# Patient Record
Sex: MALE | Race: WHITE | NOT HISPANIC OR LATINO | Employment: OTHER | URBAN - METROPOLITAN AREA
[De-identification: names, ages, dates, MRNs, and addresses within clinical notes are randomized per-mention and may not be internally consistent; named-entity substitution may affect disease eponyms.]

---

## 2017-04-25 ENCOUNTER — HISTORICAL (OUTPATIENT)
Dept: LAB | Facility: HOSPITAL | Age: 62
End: 2017-04-25

## 2017-12-13 LAB — CRC RECOMMENDATION EXT: NORMAL

## 2023-06-20 DIAGNOSIS — I10 HYPERTENSION, UNSPECIFIED TYPE: ICD-10-CM

## 2023-06-20 DIAGNOSIS — F41.9 ANXIETY: Primary | ICD-10-CM

## 2023-06-20 RX ORDER — METOPROLOL SUCCINATE 50 MG/1
50 TABLET, EXTENDED RELEASE ORAL DAILY
Qty: 30 TABLET | Refills: 2 | Status: SHIPPED | OUTPATIENT
Start: 2023-06-20 | End: 2023-09-11 | Stop reason: SDUPTHER

## 2023-06-20 RX ORDER — ALPRAZOLAM 0.5 MG/1
0.5 TABLET ORAL 3 TIMES DAILY PRN
Qty: 90 TABLET | Refills: 2 | Status: SHIPPED | OUTPATIENT
Start: 2023-06-20 | End: 2023-09-21 | Stop reason: SDUPTHER

## 2023-06-20 NOTE — TELEPHONE ENCOUNTER
Pharmacy faxed refill request on Xanax and Metoprolol. Will send script to Dr. Amador for approval. Patient last seen by Dr. Amador 4/3/23.

## 2023-07-20 DIAGNOSIS — I10 HYPERTENSION, UNSPECIFIED TYPE: Primary | ICD-10-CM

## 2023-07-20 RX ORDER — VENLAFAXINE HYDROCHLORIDE 75 MG/1
CAPSULE, EXTENDED RELEASE ORAL 2 TIMES DAILY
COMMUNITY
Start: 2023-07-19 | End: 2023-09-21

## 2023-07-20 RX ORDER — IRBESARTAN 150 MG/1
150 TABLET ORAL
COMMUNITY
Start: 2023-07-19 | End: 2023-07-20 | Stop reason: SDUPTHER

## 2023-07-20 RX ORDER — ALBUTEROL SULFATE 90 UG/1
AEROSOL, METERED RESPIRATORY (INHALATION)
COMMUNITY
Start: 2023-06-02 | End: 2023-09-21 | Stop reason: SDUPTHER

## 2023-07-20 RX ORDER — NEOMYCIN SULFATE, POLYMYXIN B SULFATE, AND DEXAMETHASONE 3.5; 10000; 1 MG/G; [USP'U]/G; MG/G
OINTMENT OPHTHALMIC
COMMUNITY
Start: 2023-06-29

## 2023-07-20 RX ORDER — ATORVASTATIN CALCIUM 20 MG/1
20 TABLET, FILM COATED ORAL NIGHTLY
COMMUNITY
Start: 2023-04-26 | End: 2023-09-21 | Stop reason: SDUPTHER

## 2023-07-20 RX ORDER — MONTELUKAST SODIUM 10 MG/1
10 TABLET ORAL
COMMUNITY
Start: 2023-05-12 | End: 2023-09-21 | Stop reason: SDUPTHER

## 2023-07-20 RX ORDER — BUPRENORPHINE AND NALOXONE 2; .5 MG/1; MG/1
FILM, SOLUBLE BUCCAL; SUBLINGUAL DAILY
COMMUNITY
Start: 2023-07-19

## 2023-07-20 RX ORDER — IRBESARTAN 150 MG/1
150 TABLET ORAL DAILY
Qty: 30 TABLET | Refills: 1 | Status: SHIPPED | OUTPATIENT
Start: 2023-07-20 | End: 2023-09-21 | Stop reason: SDUPTHER

## 2023-07-20 RX ORDER — ESOMEPRAZOLE MAGNESIUM 40 MG/1
40 CAPSULE, DELAYED RELEASE ORAL 2 TIMES DAILY
COMMUNITY
Start: 2023-04-26 | End: 2024-01-31 | Stop reason: SDUPTHER

## 2023-09-11 DIAGNOSIS — I10 HYPERTENSION, UNSPECIFIED TYPE: ICD-10-CM

## 2023-09-11 RX ORDER — METOPROLOL SUCCINATE 50 MG/1
25 TABLET, EXTENDED RELEASE ORAL 2 TIMES DAILY
Qty: 30 TABLET | Refills: 2 | Status: SHIPPED | OUTPATIENT
Start: 2023-09-11 | End: 2023-09-21

## 2023-09-13 PROCEDURE — 85025 COMPLETE CBC W/AUTO DIFF WBC: CPT | Performed by: FAMILY MEDICINE

## 2023-09-13 PROCEDURE — 82306 VITAMIN D 25 HYDROXY: CPT | Performed by: FAMILY MEDICINE

## 2023-09-13 PROCEDURE — 82550 ASSAY OF CK (CPK): CPT | Performed by: FAMILY MEDICINE

## 2023-09-13 PROCEDURE — 83036 HEMOGLOBIN GLYCOSYLATED A1C: CPT | Performed by: FAMILY MEDICINE

## 2023-09-13 PROCEDURE — 84443 ASSAY THYROID STIM HORMONE: CPT | Performed by: FAMILY MEDICINE

## 2023-09-13 PROCEDURE — 80076 HEPATIC FUNCTION PANEL: CPT | Performed by: FAMILY MEDICINE

## 2023-09-13 PROCEDURE — 80048 BASIC METABOLIC PNL TOTAL CA: CPT | Performed by: FAMILY MEDICINE

## 2023-09-13 PROCEDURE — 84550 ASSAY OF BLOOD/URIC ACID: CPT | Performed by: FAMILY MEDICINE

## 2023-09-13 PROCEDURE — 80061 LIPID PANEL: CPT | Performed by: FAMILY MEDICINE

## 2023-09-13 PROCEDURE — 84153 ASSAY OF PSA TOTAL: CPT | Performed by: FAMILY MEDICINE

## 2023-09-18 PROBLEM — E78.5 HYPERLIPIDEMIA: Status: ACTIVE | Noted: 2023-09-18

## 2023-09-18 PROBLEM — J30.9 NASAL SINUS INFLAMMATION DUE TO ALLERGY: Status: ACTIVE | Noted: 2023-09-18

## 2023-09-18 PROBLEM — I10 ESSENTIAL HYPERTENSION, BENIGN: Status: ACTIVE | Noted: 2023-09-18

## 2023-09-18 PROBLEM — F41.1 GENERALIZED ANXIETY DISORDER: Status: ACTIVE | Noted: 2023-09-18

## 2023-09-18 PROBLEM — K21.9 GERD (GASTROESOPHAGEAL REFLUX DISEASE): Status: ACTIVE | Noted: 2023-09-18

## 2023-09-21 ENCOUNTER — OFFICE VISIT (OUTPATIENT)
Dept: FAMILY MEDICINE | Facility: CLINIC | Age: 68
End: 2023-09-21
Payer: MEDICARE

## 2023-09-21 VITALS
OXYGEN SATURATION: 96 % | HEIGHT: 64 IN | BODY MASS INDEX: 26.63 KG/M2 | SYSTOLIC BLOOD PRESSURE: 132 MMHG | TEMPERATURE: 97 F | RESPIRATION RATE: 20 BRPM | DIASTOLIC BLOOD PRESSURE: 82 MMHG | HEART RATE: 80 BPM | WEIGHT: 156 LBS

## 2023-09-21 DIAGNOSIS — Z88.9 HX OF SEASONAL ALLERGIES: ICD-10-CM

## 2023-09-21 DIAGNOSIS — I10 ESSENTIAL HYPERTENSION, BENIGN: ICD-10-CM

## 2023-09-21 DIAGNOSIS — F41.1 GENERALIZED ANXIETY DISORDER: Primary | ICD-10-CM

## 2023-09-21 DIAGNOSIS — Z79.899 ENCOUNTER FOR LONG-TERM (CURRENT) USE OF OTHER MEDICATIONS: ICD-10-CM

## 2023-09-21 DIAGNOSIS — E78.2 MIXED HYPERLIPIDEMIA: ICD-10-CM

## 2023-09-21 DIAGNOSIS — J44.9 CHRONIC OBSTRUCTIVE PULMONARY DISEASE, UNSPECIFIED COPD TYPE: ICD-10-CM

## 2023-09-21 DIAGNOSIS — F17.200 SMOKER: ICD-10-CM

## 2023-09-21 DIAGNOSIS — Z87.891 PERSONAL HISTORY OF TOBACCO USE, PRESENTING HAZARDS TO HEALTH: ICD-10-CM

## 2023-09-21 PROBLEM — E53.8 FOLATE DEFICIENCY: Status: ACTIVE | Noted: 2023-09-21

## 2023-09-21 PROBLEM — G89.29 CHRONIC BACK PAIN: Status: ACTIVE | Noted: 2023-09-21

## 2023-09-21 PROBLEM — M54.9 CHRONIC BACK PAIN: Status: ACTIVE | Noted: 2023-09-21

## 2023-09-21 PROBLEM — G47.00 INSOMNIA: Status: ACTIVE | Noted: 2023-09-21

## 2023-09-21 PROBLEM — K29.70 GASTRITIS: Status: ACTIVE | Noted: 2023-09-21

## 2023-09-21 PROBLEM — G56.00 CTS (CARPAL TUNNEL SYNDROME): Status: ACTIVE | Noted: 2023-09-21

## 2023-09-21 PROBLEM — D64.9 ANEMIA: Status: ACTIVE | Noted: 2023-09-21

## 2023-09-21 PROBLEM — R73.9 HYPERGLYCEMIA: Status: ACTIVE | Noted: 2023-09-21

## 2023-09-21 PROBLEM — F32.A DEPRESSION: Status: ACTIVE | Noted: 2023-09-21

## 2023-09-21 PROCEDURE — 99214 OFFICE O/P EST MOD 30 MIN: CPT | Mod: ,,, | Performed by: FAMILY MEDICINE

## 2023-09-21 PROCEDURE — 99214 PR OFFICE/OUTPT VISIT, EST, LEVL IV, 30-39 MIN: ICD-10-PCS | Mod: ,,, | Performed by: FAMILY MEDICINE

## 2023-09-21 RX ORDER — FLUTICASONE FUROATE, UMECLIDINIUM BROMIDE AND VILANTEROL TRIFENATATE 200; 62.5; 25 UG/1; UG/1; UG/1
1 POWDER RESPIRATORY (INHALATION) DAILY
COMMUNITY
End: 2023-09-21 | Stop reason: SDUPTHER

## 2023-09-21 RX ORDER — ALBUTEROL SULFATE 90 UG/1
1-2 AEROSOL, METERED RESPIRATORY (INHALATION)
Qty: 18 G | Refills: 5 | Status: SHIPPED | OUTPATIENT
Start: 2023-09-21

## 2023-09-21 RX ORDER — ATORVASTATIN CALCIUM 20 MG/1
20 TABLET, FILM COATED ORAL NIGHTLY
Qty: 90 TABLET | Refills: 3 | Status: SHIPPED | OUTPATIENT
Start: 2023-09-21 | End: 2024-01-31 | Stop reason: SDUPTHER

## 2023-09-21 RX ORDER — FLUTICASONE FUROATE, UMECLIDINIUM BROMIDE AND VILANTEROL TRIFENATATE 200; 62.5; 25 UG/1; UG/1; UG/1
1 POWDER RESPIRATORY (INHALATION) DAILY
Qty: 60 EACH | Refills: 11 | Status: SHIPPED | OUTPATIENT
Start: 2023-09-21 | End: 2024-01-31 | Stop reason: SDUPTHER

## 2023-09-21 RX ORDER — METOPROLOL SUCCINATE 50 MG/1
50 TABLET, EXTENDED RELEASE ORAL DAILY
Qty: 90 TABLET | Refills: 3 | Status: SHIPPED | OUTPATIENT
Start: 2023-09-21 | End: 2024-01-31 | Stop reason: SDUPTHER

## 2023-09-21 RX ORDER — METOPROLOL SUCCINATE 50 MG/1
50 TABLET, EXTENDED RELEASE ORAL DAILY
Start: 2023-09-21 | End: 2023-09-21 | Stop reason: SDUPTHER

## 2023-09-21 RX ORDER — LANOLIN ALCOHOL/MO/W.PET/CERES
1000 CREAM (GRAM) TOPICAL DAILY
COMMUNITY

## 2023-09-21 RX ORDER — ALPRAZOLAM 0.5 MG/1
0.5 TABLET ORAL 3 TIMES DAILY PRN
Qty: 90 TABLET | Refills: 2 | Status: SHIPPED | OUTPATIENT
Start: 2023-09-21 | End: 2023-12-22 | Stop reason: SDUPTHER

## 2023-09-21 RX ORDER — ASCORBIC ACID 500 MG
500 TABLET ORAL DAILY
COMMUNITY

## 2023-09-21 RX ORDER — QUETIAPINE FUMARATE 50 MG/1
50 TABLET, FILM COATED ORAL NIGHTLY
Qty: 30 TABLET | Refills: 11 | Status: SHIPPED | OUTPATIENT
Start: 2023-09-21 | End: 2024-01-31

## 2023-09-21 RX ORDER — MONTELUKAST SODIUM 10 MG/1
10 TABLET ORAL DAILY
Qty: 90 TABLET | Refills: 3 | Status: SHIPPED | OUTPATIENT
Start: 2023-09-21 | End: 2024-01-31

## 2023-09-21 RX ORDER — IRBESARTAN 150 MG/1
150 TABLET ORAL DAILY
Qty: 90 TABLET | Refills: 3 | Status: SHIPPED | OUTPATIENT
Start: 2023-09-21 | End: 2024-01-31 | Stop reason: SDUPTHER

## 2023-09-21 NOTE — ASSESSMENT & PLAN NOTE
Patient's cholesterol is well controlled.  His last LDL was 74 in November of 2022.  We will recheck his cholesterol in 2 months.

## 2023-09-21 NOTE — PROGRESS NOTES
Patient Name: Varinder Pantoja Jr.     Patient ID: [id number]     Chief Complaint: Follow-up (Here for medication refill Xanax .5 mg  TID, and lab results.)      HPI:     Varinder Pantoja Jr. is a 68 y.o. male here today for Anxiety follow up & insomnia.    Past Medical History:   Diagnosis Date    Anemia     Chronic back pain     COPD (chronic obstructive pulmonary disease)     CTS (carpal tunnel syndrome)     Depression     Essential hypertension, benign     Folate deficiency     Gastritis     Generalized anxiety disorder     GERD (gastroesophageal reflux disease)     Hyperglycemia     Hyperlipidemia     Insomnia     Nasal sinus inflammation due to allergy     Smoker         Past Surgical History:   Procedure Laterality Date    BACK SURGERY      BILATERAL INGUINAL HERNIA REPAIR      TONSILLECTOMY          Social History     Socioeconomic History    Marital status:     Number of children: 1   Tobacco Use    Smoking status: Every Day     Current packs/day: 1.00     Types: Cigarettes    Smokeless tobacco: Never   Substance and Sexual Activity    Alcohol use: Not Currently     Comment: Quit 10 years ago    Drug use: Never        Current Outpatient Medications   Medication Instructions    albuterol (PROVENTIL/VENTOLIN HFA) 90 mcg/actuation inhaler 1-2 puffs, Inhalation, Every 4-6 hours PRN    ALPRAZolam (XANAX) 0.5 mg, Oral, 3 times daily PRN    ascorbic acid (vitamin C) (VITAMIN C) 500 mg, Oral, Daily    atorvastatin (LIPITOR) 20 mg, Oral, Nightly    buprenorphine-naloxone 2-0.5 mg (SUBOXONE) 2-0.5 mg Film Sublingual, Daily    cyanocobalamin (VITAMIN B-12) 1,000 mcg, Oral, Daily    esomeprazole (NEXIUM) 40 mg, Oral, 2 times daily    fluticasone-umeclidin-vilanter (TRELEGY ELLIPTA) 200-62.5-25 mcg inhaler 1 puff, Inhalation, Daily    irbesartan (AVAPRO) 150 mg, Oral, Daily    metoprolol succinate (TOPROL-XL) 50 mg, Oral, Daily    montelukast (SINGULAIR) 10 mg, Oral, Daily    neomycin-polymyxin-dexamethasone (DEXACINE)  "3.5 mg/g-10,000 unit/g-0.1 % Oint No dose, route, or frequency recorded.    QUEtiapine (SEROQUEL) 50 mg, Oral, Nightly       Review of patient's allergies indicates:  No Known Allergies       There is no immunization history on file for this patient.    Patient Care Team:  Byron Amador Sr., MD as PCP - General (Family Medicine)     Subjective:     Review of Systems    10 point review of systems conducted, negative except as stated in the history of present illness. See HPI for details.    Objective:     Visit Vitals  /82 (BP Location: Left arm, Patient Position: Sitting, BP Method: Medium (Manual))   Pulse 80   Temp 97 °F (36.1 °C)   Resp 20   Ht 5' 3.5" (1.613 m)   Wt 70.8 kg (156 lb)   SpO2 96%   BMI 27.20 kg/m²       Physical Exam  Constitutional:       Appearance: Normal appearance.   HENT:      Head: Normocephalic and atraumatic.   Cardiovascular:      Rate and Rhythm: Normal rate and regular rhythm.   Pulmonary:      Effort: Pulmonary effort is normal.      Breath sounds: Normal breath sounds.   Abdominal:      Palpations: Abdomen is soft.      Tenderness: There is no abdominal tenderness.   Musculoskeletal:         General: No swelling or tenderness. Normal range of motion.      Cervical back: Normal range of motion and neck supple.      Right lower leg: No edema.      Left lower leg: No edema.   Lymphadenopathy:      Cervical: No cervical adenopathy.   Skin:     General: Skin is warm and dry.   Neurological:      General: No focal deficit present.      Mental Status: He is alert and oriented to person, place, and time.   Psychiatric:         Mood and Affect: Mood normal.           Assessment:       ICD-10-CM ICD-9-CM   1. Generalized anxiety disorder  F41.1 300.02   2. Essential hypertension, benign  I10 401.1   3. Mixed hyperlipidemia  E78.2 272.2   4. Chronic obstructive pulmonary disease, unspecified COPD type  J44.9 496   5. Hx of seasonal allergies  Z88.9 V15.09   6. Smoker  F17.200 " 305.1   7. Personal history of tobacco use, presenting hazards to health  Z87.891 V15.82   8. Encounter for long-term (current) use of other medications  Z79.899 V58.69        Plan:     1. Generalized anxiety disorder  Overview:  Continue with Xanax 0.5 mg TID PRN anxiety.  Patient is well controlled.  Practice deep breathing or abdominal breathing exercises when anxiety occurs.  Exercise daily. Get sunlight daily.  Avoid caffeine, alcohol and stimulants.  Practice positive phrases and repeat throughout the day, along with yoga and relaxation techniques.  Set healthy boundaries, avoid people and conversations that increase stress.  Educated patient on the risks of serotonin based medications such as serotonin modulators and SSRIs/SNRIs including common side effects of nausea, GI upset, headache dizziness as well as the rare risk for worsening symptoms of depression including development of suicidal thoughts or ideations, and serotonin syndrome.   Discussed benefits of medication not becoming noticeable until up to 6 weeks from start date.   Reports any symptoms of suicidal or homicidal ideations immediately, if clinic is closed go to nearest emergency room.  Discussed possibility of using benzodiazepines    Assessment & Plan:  Instructed patient to try and wean down the dosage of Xanax.  We are going to start the patient on low-dose Seroquel in an effort to help reduce his anxiety and insomnia and in turn help reduce his dependence on alprazolam.  Risks and benefits of Seroquel were discussed and patient agreed to a trial.    Orders:  -     QUEtiapine (SEROQUEL) 50 MG tablet; Take 1 tablet (50 mg total) by mouth every evening.  Dispense: 30 tablet; Refill: 11  -     ALPRAZolam (XANAX) 0.5 MG tablet; Take 1 tablet (0.5 mg total) by mouth 3 (three) times daily as needed for Anxiety.  Dispense: 90 tablet; Refill: 2    2. Essential hypertension, benign  Overview:  Continue with Irbesartan 150 mg daily + Toprol XL 50 mg  daily.  Patient is well controlled.  Low Sodium Diet (DASH Diet - Less than 2 grams of sodium per day).  Monitor blood pressure daily and log. Report consistent numbers greater than 140/90.  Maintain healthy weight with goal BMI <30. Exercise 30 minutes per day, 5 days per week.    Orders:  -     Discontinue: metoprolol succinate (TOPROL-XL) 50 MG 24 hr tablet; Take 1 tablet (50 mg total) by mouth once daily.  -     irbesartan (AVAPRO) 150 MG tablet; Take 1 tablet (150 mg total) by mouth once daily.  Dispense: 90 tablet; Refill: 3  -     metoprolol succinate (TOPROL-XL) 50 MG 24 hr tablet; Take 1 tablet (50 mg total) by mouth once daily.  Dispense: 90 tablet; Refill: 3    3. Mixed hyperlipidemia  Overview:  Continue with present medication:  Atorvastatin 20 mg daily    Stressed importance of dietary modifications. Follow a low cholesterol, low saturated fat diet with less that 200mg of cholesterol a day.  Avoid fried foods and high saturated fats (high saturated fats less than 7% of calories).  Add Flax Seed/Fish Oil supplements to diet. Increase dietary fiber.  Regular exercise can reduce LDL and raise HDL. Stressed importance of physical activity 5 times per week for 30 minutes per day.     Assessment & Plan:  Patient's cholesterol is well controlled.  His last LDL was 74 in November of 2022.  We will recheck his cholesterol in 2 months.        4. Chronic obstructive pulmonary disease, unspecified COPD type  Overview:  Continue with Ventolin HFA 90 mcg 1-2 puffs Q4-6 hours PRN SOB, cough, or wheezing.  He has not been using his Trelegy as directed.  Patient is not well controlled.  Avoid/ limit triggers such as pollen ,dust ,mold and animal dander.  Avoid smoke ,strong chemicals and strong cleaning products in addition to strong perfumes and colognes.  Use rescue inhaler or nebulizer when needed for wheezing or shortness of breath.  Report any respiratory tract infections and seek treatment quickly.  If on a  steroid maintenance inhaler this should be used every day.  If indicated weight loss is recommended.    Assessment & Plan:  Start taking Trelegy Ellipta 200-62.5-25 mcg 1 puff daily as prescribed, use every day.    Orders:  -     fluticasone-umeclidin-vilanter (TRELEGY ELLIPTA) 200-62.5-25 mcg inhaler; Inhale 1 puff into the lungs once daily.  Dispense: 60 each; Refill: 11  -     albuterol (PROVENTIL/VENTOLIN HFA) 90 mcg/actuation inhaler; Inhale 1-2 puffs into the lungs every 4 to 6 hours as needed for Wheezing.  Dispense: 18 g; Refill: 5    5. Hx of seasonal allergies  Comments:  Patient's sinus allergies well controlled with Singulair 10 mg daily.    6. Smoker  Overview:  Patient was offered smoking cessation techniques such as nicotine gum or patches.  They were also offered a more regimented smoking cessation program.  They were advised to decrease the number of cigarettes by 1 every few days until they completely stopped.  It was strongly recommended that they set a quit date and stick to it.  And finally, important health reasons to stop smoking were given to the patient. Approximately 5   minutes was spent discussing smoking cessation.    Orders:  -     CT Chest Lung Screening Low Dose; Future; Expected date: 09/21/2023    7. Personal history of tobacco use, presenting hazards to health  -     CT Chest Lung Screening Low Dose; Future; Expected date: 09/21/2023    8. Encounter for long-term (current) use of other medications  Overview:  Patient was instructed to continue with the prescribed medications as no adverse or cost issues were found.   Refills were given if needed.  Compliance issues were discussed as far as medications that patient is currently taking.  Discussed the possibility of getting off some medications that were not absolutely necessary.    Orders:  -     montelukast (SINGULAIR) 10 mg tablet; Take 1 tablet (10 mg total) by mouth once daily.  Dispense: 90 tablet; Refill: 3  -     atorvastatin  (LIPITOR) 20 MG tablet; Take 1 tablet (20 mg total) by mouth every evening.  Dispense: 90 tablet; Refill: 3        [] Discussed lab findings with the patient.  [] Discussed diet, exercise and if appropriate, weight loss.  [x] Instructions and information, including risks and benefits of prescribed medication(s) have been reviewed with the patient and patient verbalizes understanding. Questions have been answered to the patient's satisfaction.  [x] Appropriate counseling has been given regarding anxiety and depressive issues that were discussed today.  [] Any lab drawn today will be reviewed by physician at the time it is received and appropriate recommendations bill be made and discussed with patient.     Follow up in about 2 months (around 11/21/2023) for Follow Up.   In addition to their scheduled follow up, the patient has also been instructed to follow up on as needed basis.     Future Appointments   Date Time Provider Department Center   11/21/2023  2:00 PM Byron Amador Sr., MD [facility] Amador Sr, MD

## 2023-09-21 NOTE — ASSESSMENT & PLAN NOTE
Instructed patient to try and wean down the dosage of Xanax.  We are going to start the patient on low-dose Seroquel in an effort to help reduce his anxiety and insomnia and in turn help reduce his dependence on alprazolam.  Risks and benefits of Seroquel were discussed and patient agreed to a trial.

## 2023-11-06 ENCOUNTER — TELEPHONE (OUTPATIENT)
Dept: FAMILY MEDICINE | Facility: CLINIC | Age: 68
End: 2023-11-06
Payer: MEDICARE

## 2023-11-06 DIAGNOSIS — F17.200 SMOKER: Primary | ICD-10-CM

## 2023-11-06 DIAGNOSIS — Z87.891 PERSONAL HISTORY OF TOBACCO USE, PRESENTING HAZARDS TO HEALTH: ICD-10-CM

## 2023-11-06 NOTE — TELEPHONE ENCOUNTER
Spoke with patient in regards to CT of the chest that was supposed to be done. [facility] had the wrong number so a new order was sent and I spoke with patient to get correct phone number. Put the correct number on new order sent and told patient t be expecting a call.

## 2023-12-06 DIAGNOSIS — F17.210 NICOTINE DEPENDENCE, CIGARETTES, UNCOMPLICATED: ICD-10-CM

## 2023-12-06 DIAGNOSIS — F17.200 SMOKER: Primary | ICD-10-CM

## 2023-12-07 ENCOUNTER — TELEPHONE (OUTPATIENT)
Dept: FAMILY MEDICINE | Facility: CLINIC | Age: 68
End: 2023-12-07
Payer: MEDICARE

## 2023-12-07 NOTE — TELEPHONE ENCOUNTER
----- Message from Emma Victor sent at 12/6/2023  1:05 PM CST -----  Regarding: RETURNING CALL  HE LEFT A MESSAGE SAYING HE WAS RETURNING A CALL [phone]

## 2023-12-22 DIAGNOSIS — F41.1 GENERALIZED ANXIETY DISORDER: ICD-10-CM

## 2023-12-22 RX ORDER — ALPRAZOLAM 0.5 MG/1
0.5 TABLET ORAL 3 TIMES DAILY PRN
Qty: 90 TABLET | Refills: 0 | Status: SHIPPED | OUTPATIENT
Start: 2023-12-22 | End: 2024-01-31 | Stop reason: SDUPTHER

## 2023-12-22 NOTE — TELEPHONE ENCOUNTER
Last refill Alprazolam 0.5 mg   1 po TID prn   # 90 X 2  On 9/21/23        Last ov 9/21/23      Next appt 1/29/24  Lab    Next appt 1/31/24   results

## 2024-01-29 PROCEDURE — 80053 COMPREHEN METABOLIC PANEL: CPT | Performed by: FAMILY MEDICINE

## 2024-01-29 PROCEDURE — 82746 ASSAY OF FOLIC ACID SERUM: CPT | Performed by: FAMILY MEDICINE

## 2024-01-29 PROCEDURE — 82607 VITAMIN B-12: CPT | Performed by: FAMILY MEDICINE

## 2024-01-29 PROCEDURE — 85025 COMPLETE CBC W/AUTO DIFF WBC: CPT | Performed by: FAMILY MEDICINE

## 2024-01-29 PROCEDURE — 84443 ASSAY THYROID STIM HORMONE: CPT | Performed by: FAMILY MEDICINE

## 2024-01-29 PROCEDURE — 80061 LIPID PANEL: CPT | Performed by: FAMILY MEDICINE

## 2024-01-31 ENCOUNTER — OFFICE VISIT (OUTPATIENT)
Dept: FAMILY MEDICINE | Facility: CLINIC | Age: 69
End: 2024-01-31
Payer: MEDICARE

## 2024-01-31 VITALS
HEIGHT: 64 IN | RESPIRATION RATE: 20 BRPM | DIASTOLIC BLOOD PRESSURE: 72 MMHG | HEART RATE: 80 BPM | WEIGHT: 160.31 LBS | SYSTOLIC BLOOD PRESSURE: 132 MMHG | TEMPERATURE: 98 F | OXYGEN SATURATION: 95 % | BODY MASS INDEX: 27.37 KG/M2

## 2024-01-31 DIAGNOSIS — F17.200 SMOKER: ICD-10-CM

## 2024-01-31 DIAGNOSIS — J44.9 CHRONIC OBSTRUCTIVE PULMONARY DISEASE, UNSPECIFIED COPD TYPE: ICD-10-CM

## 2024-01-31 DIAGNOSIS — K21.9 GASTROESOPHAGEAL REFLUX DISEASE, UNSPECIFIED WHETHER ESOPHAGITIS PRESENT: ICD-10-CM

## 2024-01-31 DIAGNOSIS — F41.1 GENERALIZED ANXIETY DISORDER: ICD-10-CM

## 2024-01-31 DIAGNOSIS — D64.9 ANEMIA, UNSPECIFIED TYPE: ICD-10-CM

## 2024-01-31 DIAGNOSIS — Z13.6 ENCOUNTER FOR SCREENING FOR CARDIOVASCULAR DISORDERS: ICD-10-CM

## 2024-01-31 DIAGNOSIS — E78.2 MIXED HYPERLIPIDEMIA: ICD-10-CM

## 2024-01-31 DIAGNOSIS — F32.A DEPRESSION, UNSPECIFIED DEPRESSION TYPE: ICD-10-CM

## 2024-01-31 DIAGNOSIS — I10 ESSENTIAL HYPERTENSION, BENIGN: Primary | ICD-10-CM

## 2024-01-31 PROBLEM — E53.8 FOLATE DEFICIENCY: Status: RESOLVED | Noted: 2023-09-21 | Resolved: 2024-01-31

## 2024-01-31 PROBLEM — K29.70 GASTRITIS: Status: RESOLVED | Noted: 2023-09-21 | Resolved: 2024-01-31

## 2024-01-31 PROBLEM — J30.9 NASAL SINUS INFLAMMATION DUE TO ALLERGY: Status: RESOLVED | Noted: 2023-09-18 | Resolved: 2024-01-31

## 2024-01-31 PROBLEM — G56.00 CTS (CARPAL TUNNEL SYNDROME): Status: RESOLVED | Noted: 2023-09-21 | Resolved: 2024-01-31

## 2024-01-31 PROBLEM — Z88.9 HX OF SEASONAL ALLERGIES: Status: RESOLVED | Noted: 2023-09-21 | Resolved: 2024-01-31

## 2024-01-31 PROCEDURE — 99214 OFFICE O/P EST MOD 30 MIN: CPT | Mod: ,,, | Performed by: FAMILY MEDICINE

## 2024-01-31 RX ORDER — VENLAFAXINE HYDROCHLORIDE 75 MG/1
75 CAPSULE, EXTENDED RELEASE ORAL 2 TIMES DAILY
Qty: 180 CAPSULE | Refills: 3 | Status: SHIPPED | OUTPATIENT
Start: 2024-01-31 | End: 2024-05-16

## 2024-01-31 RX ORDER — AMOXICILLIN 500 MG/1
500 TABLET, FILM COATED ORAL 4 TIMES DAILY
COMMUNITY
End: 2024-05-16 | Stop reason: ALTCHOICE

## 2024-01-31 RX ORDER — IRBESARTAN 150 MG/1
150 TABLET ORAL DAILY
Qty: 90 TABLET | Refills: 3 | Status: SHIPPED | OUTPATIENT
Start: 2024-01-31

## 2024-01-31 RX ORDER — METOPROLOL SUCCINATE 50 MG/1
50 TABLET, EXTENDED RELEASE ORAL DAILY
Qty: 90 TABLET | Refills: 3 | Status: SHIPPED | OUTPATIENT
Start: 2024-01-31

## 2024-01-31 RX ORDER — ALPRAZOLAM 0.5 MG/1
0.5 TABLET ORAL 3 TIMES DAILY PRN
Qty: 90 TABLET | Refills: 2 | Status: SHIPPED | OUTPATIENT
Start: 2024-01-31 | End: 2024-05-09 | Stop reason: SDUPTHER

## 2024-01-31 RX ORDER — ATORVASTATIN CALCIUM 20 MG/1
20 TABLET, FILM COATED ORAL NIGHTLY
Qty: 90 TABLET | Refills: 3 | Status: SHIPPED | OUTPATIENT
Start: 2024-01-31

## 2024-01-31 RX ORDER — ESOMEPRAZOLE MAGNESIUM 40 MG/1
40 CAPSULE, DELAYED RELEASE ORAL 2 TIMES DAILY
Qty: 90 CAPSULE | Refills: 3 | Status: SHIPPED | OUTPATIENT
Start: 2024-01-31

## 2024-01-31 RX ORDER — VENLAFAXINE HYDROCHLORIDE 75 MG/1
75 CAPSULE, EXTENDED RELEASE ORAL 2 TIMES DAILY
COMMUNITY
Start: 2024-01-08 | End: 2024-01-31 | Stop reason: SDUPTHER

## 2024-01-31 RX ORDER — FLUTICASONE FUROATE, UMECLIDINIUM BROMIDE AND VILANTEROL TRIFENATATE 200; 62.5; 25 UG/1; UG/1; UG/1
1 POWDER RESPIRATORY (INHALATION) DAILY
Qty: 60 EACH | Refills: 11 | Status: SHIPPED | OUTPATIENT
Start: 2024-01-31 | End: 2024-04-29 | Stop reason: SDUPTHER

## 2024-01-31 NOTE — ASSESSMENT & PLAN NOTE
Lab Results   Component Value Date    LDL 85.00 01/29/2024    TRIG 67 01/29/2024    HDL 44 01/29/2024    TOTALCHOLEST 3 01/29/2024   Patient is at goal.

## 2024-01-31 NOTE — PROGRESS NOTES
Patient Name: Varinder Pantoja Jr.     Patient ID: [id number]     Chief Complaint: Results (Here for lab results.) and Medication Refill (Refill on Xanax.)      HPI:     Varinder Pantoja Jr. is a 68 y.o. male here today for follow up for Hypertension, Hyperlipidemia, Anemia, COPD, Depression, Anxiety, GERD, and lab work results. Reviewed and discussed lab work results.    Past Medical History:   Diagnosis Date    Anemia     Chronic back pain     COPD (chronic obstructive pulmonary disease)     CTS (carpal tunnel syndrome)     Depression     Essential hypertension, benign     Folate deficiency     Gastritis     Generalized anxiety disorder     GERD (gastroesophageal reflux disease)     Hyperglycemia     Hyperlipidemia     Insomnia     Nasal sinus inflammation due to allergy     Smoker     Vitamin D deficiency         Past Surgical History:   Procedure Laterality Date    BACK SURGERY      BILATERAL INGUINAL HERNIA REPAIR      SURGICAL REMOVAL OF NODULE OF VOCAL CORD      TONSILLECTOMY          Social History     Socioeconomic History    Marital status:     Number of children: 1   Tobacco Use    Smoking status: Every Day     Current packs/day: 0.50     Types: Cigarettes    Smokeless tobacco: Never   Substance and Sexual Activity    Alcohol use: Not Currently     Comment: Quit 10 years ago    Drug use: Never        Current Outpatient Medications   Medication Instructions    albuterol (PROVENTIL/VENTOLIN HFA) 90 mcg/actuation inhaler 1-2 puffs, Inhalation, Every 4-6 hours PRN    ALPRAZolam (XANAX) 0.5 mg, Oral, 3 times daily PRN    amoxicillin (AMOXIL) 500 mg, Oral, 4 times daily, For tooth  Rx on 1/26/24  Dr Vadim Bonilla    ascorbic acid (vitamin C) (VITAMIN C) 500 mg, Oral, Daily    atorvastatin (LIPITOR) 20 mg, Oral, Nightly    buprenorphine-naloxone 2-0.5 mg (SUBOXONE) 2-0.5 mg Film Sublingual, Daily    cyanocobalamin (VITAMIN B-12) 1,000 mcg, Oral, Daily    esomeprazole (NEXIUM) 40 mg, Oral, 2 times daily     "fluticasone-umeclidin-vilanter (TRELEGY ELLIPTA) 200-62.5-25 mcg inhaler 1 puff, Inhalation, Daily    irbesartan (AVAPRO) 150 mg, Oral, Daily    metoprolol succinate (TOPROL-XL) 50 mg, Oral, Daily    neomycin-polymyxin-dexamethasone (DEXACINE) 3.5 mg/g-10,000 unit/g-0.1 % Oint No dose, route, or frequency recorded.    venlafaxine (EFFEXOR-XR) 75 mg, Oral, 2 times daily       Review of patient's allergies indicates:  No Known Allergies       There is no immunization history on file for this patient.    Patient Care Team:  Byron Amador Sr., MD as PCP - General (Family Medicine)  Aayush Al MD (Ophthalmology)     Subjective:     Review of Systems    10 point review of systems conducted, negative except as stated in the history of present illness. See HPI for details.    Objective:     Visit Vitals  /72 (BP Location: Left arm, Patient Position: Sitting, BP Method: Medium (Manual))   Pulse 80   Temp 98.3 °F (36.8 °C)   Resp 20   Ht 5' 3.5" (1.613 m)   Wt 72.7 kg (160 lb 4.8 oz)   SpO2 95%   BMI 27.95 kg/m²       Physical Exam  Constitutional:       Appearance: Normal appearance.   HENT:      Head: Normocephalic and atraumatic.   Cardiovascular:      Rate and Rhythm: Normal rate and regular rhythm.   Pulmonary:      Effort: Pulmonary effort is normal.      Breath sounds: Normal breath sounds.   Abdominal:      Palpations: Abdomen is soft.      Tenderness: There is no abdominal tenderness.   Musculoskeletal:         General: No swelling or tenderness. Normal range of motion.      Cervical back: Normal range of motion and neck supple.      Right lower leg: No edema.      Left lower leg: No edema.   Lymphadenopathy:      Cervical: No cervical adenopathy.   Skin:     General: Skin is warm and dry.   Neurological:      General: No focal deficit present.      Mental Status: He is alert and oriented to person, place, and time.   Psychiatric:         Mood and Affect: Mood normal.         Assessment:       " ICD-10-CM ICD-9-CM   1. Essential hypertension, benign  I10 401.1   2. Mixed hyperlipidemia  E78.2 272.2   3. Chronic obstructive pulmonary disease, unspecified COPD type  J44.9 496   4. Gastroesophageal reflux disease, unspecified whether esophagitis present  K21.9 530.81   5. Anemia, unspecified type  D64.9 285.9   6. Generalized anxiety disorder  F41.1 300.02   7. Depression, unspecified depression type  F32.A 311   8. Smoker  F17.200 305.1   9. Encounter for screening for cardiovascular disorders  Z13.6 V81.2       Plan:   1. Essential hypertension, benign  Overview:  Hypertension Medications               irbesartan (AVAPRO) 150 MG tablet Take 1 tablet (150 mg total) by mouth once daily.    metoprolol succinate (TOPROL-XL) 50 MG 24 hr tablet Take 1 tablet (50 mg total) by mouth once daily.        Low Sodium Diet (DASH Diet - Less than 2 grams of sodium per day).  Monitor blood pressure daily and log. Report consistent numbers greater than 140/90.  Maintain healthy weight with goal BMI <30. Exercise 30 minutes per day, 5 days per week.    Assessment & Plan:  BP Readings from Last 1 Encounters:   01/31/24 132/72   Patient is at goal.     Orders:  -     irbesartan (AVAPRO) 150 MG tablet; Take 1 tablet (150 mg total) by mouth once daily.  Dispense: 90 tablet; Refill: 3  -     metoprolol succinate (TOPROL-XL) 50 MG 24 hr tablet; Take 1 tablet (50 mg total) by mouth once daily.  Dispense: 90 tablet; Refill: 3    2. Mixed hyperlipidemia  Overview:  Hyperlipidemia Medications               atorvastatin (LIPITOR) 20 MG tablet Take 1 tablet (20 mg total) by mouth every evening.        Stressed importance of dietary modifications. Follow a low cholesterol, low saturated fat diet with less that 200mg of cholesterol a day.  Avoid fried foods and high saturated fats (high saturated fats less than 7% of calories).  Add Flax Seed/Fish Oil supplements to diet. Increase dietary fiber.  Regular exercise can reduce LDL and raise  HDL. Stressed importance of physical activity 5 times per week for 30 minutes per day.     Assessment & Plan:  Lab Results   Component Value Date    LDL 85.00 01/29/2024    TRIG 67 01/29/2024    HDL 44 01/29/2024    TOTALCHOLEST 3 01/29/2024   Patient is at goal.     Orders:  -     atorvastatin (LIPITOR) 20 MG tablet; Take 1 tablet (20 mg total) by mouth every evening.  Dispense: 90 tablet; Refill: 3    3. Chronic obstructive pulmonary disease, unspecified COPD type  Overview:  Continue with Ventolin HFA 90 mcg 1-2 puffs Q4-6 hours PRN SOB, cough, or wheezing + Trelegy Ellipta 200-62.5-25 mcg 1 puff daily.  Avoid/ limit triggers such as pollen ,dust ,mold and animal dander.  Avoid smoke ,strong chemicals and strong cleaning products in addition to strong perfumes and colognes.  Use rescue inhaler or nebulizer when needed for wheezing or shortness of breath.  Report any respiratory tract infections and seek treatment quickly.  If on a steroid maintenance inhaler this should be used every day.  If indicated weight loss is recommended.    Assessment & Plan:  Patient is well controlled.    Orders:  -     fluticasone-umeclidin-vilanter (TRELEGY ELLIPTA) 200-62.5-25 mcg inhaler; Inhale 1 puff into the lungs once daily.  Dispense: 60 each; Refill: 11    4. Gastroesophageal reflux disease, unspecified whether esophagitis present  Overview:  Continue with Nexium 40 mg daily.  Avoid spicy, acidic, fried foods and alcohol.  Eat 2-3 hours before going to bed.  Avoid tight clothing, chew food thoroughly.  Reduce caffeine intake, avoid soda.    Assessment & Plan:  Patient is well controlled.    Orders:  -     esomeprazole (NEXIUM) 40 MG capsule; Take 1 capsule (40 mg total) by mouth 2 (two) times daily.  Dispense: 90 capsule; Refill: 3    5. Anemia, unspecified type  Overview:  H/H stable at this time.   Will continue to monitor.    Assessment & Plan:  Most recent Hgb 13 g/dL and Hct 41 % on 01/29/2024.      6. Generalized  anxiety disorder  Overview:  Continue with Xanax 0.5 mg TID PRN anxiety.  Practice deep breathing or abdominal breathing exercises when anxiety occurs.  Exercise daily. Get sunlight daily.  Avoid caffeine, alcohol and stimulants.  Practice positive phrases and repeat throughout the day, along with yoga and relaxation techniques.  Set healthy boundaries, avoid people and conversations that increase stress.  Discussed benefits of medication not becoming noticeable until up to 6 weeks from start date.   Reports any symptoms of suicidal or homicidal ideations immediately, if clinic is closed go to nearest emergency room.    Assessment & Plan:  Patient is well controlled on the above regimen.      7. Depression, unspecified depression type  Overview:  Continue with Effexor XR 75 mg BID.  Educated patient on the risks of serotonin based medications such as serotonin modulators and SSRIs/SNRIs including common side effects of nausea, GI upset, headache dizziness as well as the rare risk for worsening symptoms of depression including development of suicidal thoughts or ideations, and serotonin syndrome.   Discussed benefits of medication not becoming noticeable until up to 6 weeks from start date.   Exercise daily. Get sunlight daily.  Practice positive phrases and repeat throughout the day, along with yoga and relaxation techniques.  Establish good social support, make changes to reduce stress.  Reports any symptoms of suicidal/homicidal ideations or self harm immediately, if clinic is closed go to nearest emergency room.    Assessment & Plan:  Patient is well controlled.    Orders:  -     venlafaxine (EFFEXOR-XR) 75 MG 24 hr capsule; Take 1 capsule (75 mg total) by mouth 2 (two) times daily.  Dispense: 180 capsule; Refill: 3    8. Smoker  Overview:  Patient was offered smoking cessation techniques such as nicotine gum or patches.  They were also offered a more regimented smoking cessation program.  They were advised to  decrease the number of cigarettes by 1 every few days until they completely stopped.  It was strongly recommended that they set a quit date and stick to it.  And finally, important health reasons to stop smoking were given to the patient. Approximately 5 minutes was spent discussing smoking cessation.      9. Encounter for screening for cardiovascular disorders  -     CT Calcium Scoring Cardiac; Future; Expected date: 01/31/2024        [x] Discussed lab findings with the patient.  [x] Discussed diet, exercise and if appropriate, weight loss.  [x] Instructions and information, including risks and benefits of prescribed medication(s) have been reviewed with the patient and patient verbalizes understanding. Questions have been answered to the patient's satisfaction.  [x] Appropriate counseling has been given regarding anxiety and depressive issues that were discussed today.  [] Any lab drawn today will be reviewed by physician at the time it is received and appropriate recommendations bill be made and discussed with patient.     Follow up in about 3 months (around 4/30/2024) for Follow Up.   In addition to their scheduled follow up, the patient has also been instructed to follow up on as needed basis.     Future Appointments   Date Time Provider Department Center   5/8/2024  8:00 AM Byron Amador Sr., MD [facility] Jb Bourgeois Sr, MD

## 2024-03-22 ENCOUNTER — DOCUMENTATION ONLY (OUTPATIENT)
Dept: FAMILY MEDICINE | Facility: CLINIC | Age: 69
End: 2024-03-22
Payer: MEDICARE

## 2024-04-12 ENCOUNTER — TELEPHONE (OUTPATIENT)
Dept: FAMILY MEDICINE | Facility: CLINIC | Age: 69
End: 2024-04-12
Payer: MEDICARE

## 2024-04-12 NOTE — TELEPHONE ENCOUNTER
----- Message from Byron Amador Sr., MD sent at 4/5/2024 10:07 AM CDT -----  Tell patient has a low score of less than 10 indicating a low likelihood of any significant blockage.

## 2024-04-29 DIAGNOSIS — J44.9 CHRONIC OBSTRUCTIVE PULMONARY DISEASE, UNSPECIFIED COPD TYPE: ICD-10-CM

## 2024-04-29 RX ORDER — FLUTICASONE FUROATE, UMECLIDINIUM BROMIDE AND VILANTEROL TRIFENATATE 200; 62.5; 25 UG/1; UG/1; UG/1
1 POWDER RESPIRATORY (INHALATION) DAILY
Qty: 60 EACH | Refills: 11 | Status: SHIPPED | OUTPATIENT
Start: 2024-04-29

## 2024-04-30 DIAGNOSIS — F41.1 GENERALIZED ANXIETY DISORDER: ICD-10-CM

## 2024-04-30 NOTE — TELEPHONE ENCOUNTER
I cannot e-RX due to patient not having physical address on file. Please update this and let me know. I can fill

## 2024-05-07 RX ORDER — ALPRAZOLAM 0.5 MG/1
0.5 TABLET ORAL 3 TIMES DAILY PRN
Qty: 90 TABLET | Refills: 2 | OUTPATIENT
Start: 2024-05-07

## 2024-05-08 DIAGNOSIS — F41.1 GENERALIZED ANXIETY DISORDER: ICD-10-CM

## 2024-05-08 NOTE — TELEPHONE ENCOUNTER
"Dr Mohr  Mr Reeder  is requesting a refill on his Xanax 0.5 mg 1 po TID prn anxiety, he was last seen 1/31/2024 .Last lab was 01/2024, He was scheduled for today for his refill , but the appointment was cancelled due to no provider on campus,and is not rescheduled as of today. He is "not sleeping and all keyed up"states patient . Sulema Monsalve NP denied his Rx yesterday due to he needs a visit. If we get a cancellation next week Emma said she will put him in that time slot. Please advise  "

## 2024-05-09 RX ORDER — ALPRAZOLAM 0.5 MG/1
0.5 TABLET ORAL 3 TIMES DAILY PRN
Qty: 90 TABLET | Refills: 0 | Status: SHIPPED | OUTPATIENT
Start: 2024-05-09 | End: 2024-06-14 | Stop reason: SDUPTHER

## 2024-05-16 ENCOUNTER — OFFICE VISIT (OUTPATIENT)
Dept: FAMILY MEDICINE | Facility: CLINIC | Age: 69
End: 2024-05-16
Payer: MEDICARE

## 2024-05-16 VITALS
SYSTOLIC BLOOD PRESSURE: 138 MMHG | WEIGHT: 171 LBS | DIASTOLIC BLOOD PRESSURE: 74 MMHG | TEMPERATURE: 97 F | BODY MASS INDEX: 29.19 KG/M2 | HEART RATE: 80 BPM | HEIGHT: 64 IN | OXYGEN SATURATION: 97 % | RESPIRATION RATE: 18 BRPM

## 2024-05-16 DIAGNOSIS — F32.A DEPRESSION, UNSPECIFIED DEPRESSION TYPE: ICD-10-CM

## 2024-05-16 DIAGNOSIS — F41.1 GENERALIZED ANXIETY DISORDER: Primary | ICD-10-CM

## 2024-05-16 PROCEDURE — 99213 OFFICE O/P EST LOW 20 MIN: CPT | Mod: ,,, | Performed by: FAMILY MEDICINE

## 2024-05-16 RX ORDER — MONTELUKAST SODIUM 10 MG/1
10 TABLET ORAL NIGHTLY
COMMUNITY

## 2024-05-16 RX ORDER — CETIRIZINE HYDROCHLORIDE 10 MG/1
10 TABLET ORAL DAILY
COMMUNITY

## 2024-05-16 RX ORDER — SERTRALINE HYDROCHLORIDE 50 MG/1
50 TABLET, FILM COATED ORAL DAILY
Qty: 30 TABLET | Refills: 11 | Status: SHIPPED | OUTPATIENT
Start: 2024-05-16 | End: 2025-05-16

## 2024-05-16 RX ORDER — QUETIAPINE FUMARATE 50 MG/1
50 TABLET, FILM COATED ORAL NIGHTLY
COMMUNITY

## 2024-05-16 NOTE — PROGRESS NOTES
Family Medicine    Patient ID: [id number]   274}  Chief Complaint: Medication Refill and Depression         HPI:     Varinder Pantoja Jr. is a 69 y.o. male here today for medication management and refills. No other complaints today.     He is here for his refill of xanax. He called in earlier this week. He takes 0.5mg TID and has for anxiety.   He also has an rx for effexor. His mood is not controlled. He is not taking the effexor as it was not helping. He had no side effects on this medication. He is requesting to try zoloft. He has not tried this in the past. He reports no SI/HI but his mood is depressed, He has anxiety, trouble sleeping, melancholy, decreased interest in activities.  He takes Suboxone as well for  from Dr. Natalee Tran in [place]. 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 He is working to wean off of the suboxone.    reviewed.     274}  Past Medical History:   Diagnosis Date    Anemia     Chronic back pain     COPD (chronic obstructive pulmonary disease)     CTS (carpal tunnel syndrome)     Depression     Essential hypertension, benign     Folate deficiency     Gastritis     Generalized anxiety disorder     GERD (gastroesophageal reflux disease)     Hyperglycemia     Hyperlipidemia     Insomnia     Nasal sinus inflammation due to allergy     Smoker     Vitamin D deficiency          Past Surgical History:   Procedure Laterality Date    BACK SURGERY      BILATERAL INGUINAL HERNIA REPAIR      SURGICAL REMOVAL OF NODULE OF VOCAL CORD      TONSILLECTOMY          Social History     Tobacco Use    Smoking status: Every Day     Current packs/day: 0.50     Types: Cigarettes    Smokeless tobacco: Never   Substance and Sexual Activity    Alcohol use: Not Currently     Comment: Quit 10 years ago    Drug use: Never    Sexual activity: Not on file        Current Outpatient Medications   Medication Instructions    albuterol (PROVENTIL/VENTOLIN HFA) 90 mcg/actuation inhaler 1-2 puffs, Inhalation,  "Every 4-6 hours PRN    ALPRAZolam (XANAX) 0.5 mg, Oral, 3 times daily PRN    amoxicillin (AMOXIL) 500 mg, Oral, 4 times daily, For tooth  Rx on 1/26/24  Dr Vadim Bonilla    ascorbic acid (vitamin C) (VITAMIN C) 500 mg, Oral, Daily    atorvastatin (LIPITOR) 20 mg, Oral, Nightly    buprenorphine-naloxone 2-0.5 mg (SUBOXONE) 2-0.5 mg Film Sublingual, Daily    cetirizine (ZYRTEC) 10 mg, Oral, Daily    cyanocobalamin (VITAMIN B-12) 1,000 mcg, Oral, Daily    esomeprazole (NEXIUM) 40 mg, Oral, 2 times daily    fluticasone-umeclidin-vilanter (TRELEGY ELLIPTA) 200-62.5-25 mcg inhaler 1 puff, Inhalation, Daily    irbesartan (AVAPRO) 150 mg, Oral, Daily    metoprolol succinate (TOPROL-XL) 50 mg, Oral, Daily    montelukast (SINGULAIR) 10 mg, Oral, Nightly    neomycin-polymyxin-dexamethasone (DEXACINE) 3.5 mg/g-10,000 unit/g-0.1 % Oint No dose, route, or frequency recorded.    QUEtiapine (SEROQUEL) 50 mg, Oral, Nightly    sertraline (ZOLOFT) 50 mg, Oral, Daily     274}  Review of patient's allergies indicates:  No Known Allergies    Patient Care Team:  Byron Amador Sr., MD as PCP - General (Family Medicine)  Aayush Al MD (Ophthalmology)  Lennox Cabello MD as Consulting Physician (Gastroenterology)     Subjective:     Review of Systems    12 point review of systems conducted, negative except as stated in the history of present illness. See HPI for details.    Objective:   274}  Visit Vitals  /74   Pulse 80   Temp 97.4 °F (36.3 °C)   Resp 18   Ht 5' 3.5" (1.613 m)   Wt 77.6 kg (171 lb)   SpO2 97%   BMI 29.82 kg/m²         Physical Exam  Vitals reviewed.   Constitutional:       General: He is not in acute distress.     Appearance: Normal appearance.   Eyes:      Extraocular Movements: Extraocular movements intact.      Conjunctiva/sclera: Conjunctivae normal.   Cardiovascular:      Rate and Rhythm: Normal rate and regular rhythm.      Pulses: Normal pulses.      Heart sounds: Normal heart sounds. No murmur " heard.     No friction rub. No gallop.   Pulmonary:      Effort: Pulmonary effort is normal.      Breath sounds: Normal breath sounds. No wheezing, rhonchi or rales.   Skin:     General: Skin is warm and dry.      Findings: No lesion or rash.   Neurological:      General: No focal deficit present.      Mental Status: He is alert and oriented to person, place, and time.   Psychiatric:         Mood and Affect: Mood is anxious.         Behavior: Behavior normal.         Thought Content: Thought content normal.         Judgment: Judgment normal.         Labs Reviewed:    274}  Chemistry:  Lab Results   Component Value Date     01/29/2024    K 3.8 01/29/2024    CHLORIDE 107 01/29/2024    BUN 12.5 01/29/2024    CREATININE 0.82 01/29/2024    EGFRNORACEVR >60 01/29/2024    GLUCOSE 101 01/29/2024    CALCIUM 9.6 01/29/2024    ALKPHOS 115 01/29/2024    LABPROT 7.8 (H) 01/29/2024    ALBUMIN 4.1 01/29/2024    BILIDIR 0.2 09/13/2023    IBILI 0.30 09/13/2023    AST 19 01/29/2024    ALT 11 01/29/2024    GDJVHQKT15BG 39.1 09/13/2023    TSH 1.249 01/29/2024    PSA 0.34 09/13/2023        Lab Results   Component Value Date    HGBA1C 5.0 09/13/2023        Hematology:  Lab Results   Component Value Date    WBC 11.30 01/29/2024    HGB 13.0 (L) 01/29/2024    HCT 41.0 (L) 01/29/2024     01/29/2024       Lipid Panel:  Lab Results   Component Value Date    CHOL 142 01/29/2024    HDL 44 01/29/2024    LDL 85.00 01/29/2024    TRIG 67 01/29/2024    TOTALCHOLEST 3 01/29/2024          Assessment:    274}    ICD-10-CM ICD-9-CM   1. Generalized anxiety disorder  F41.1 300.02   2. Depression, unspecified depression type  F32.A 311        Plan:     1. Generalized anxiety disorder  Overview:  Continue with Xanax 0.5 mg TID PRN anxiety.  Practice deep breathing or abdominal breathing exercises when anxiety occurs.  Exercise daily. Get sunlight daily.  Avoid caffeine, alcohol and stimulants.  Practice positive phrases and repeat throughout  the day, along with yoga and relaxation techniques.  Set healthy boundaries, avoid people and conversations that increase stress.  Discussed benefits of medication not becoming noticeable until up to 6 weeks from start date.   Reports any symptoms of suicidal or homicidal ideations immediately, if clinic is closed go to nearest emergency room.    Assessment & Plan:  Not controlled.   Try zoloft 50mg.   Start with 25mg x 1 week then up to 50mg.   Rtc in 4 weeks or sooner if problems arise.     Red flags and indication for immediate medical attention discussed with the patient who is receptive, expressed understanding and is agreeable to plan. All questions answered.    Continue care with suboxone clinic.   Discussed need to wean off xanax in future. Pt is aware of need.   Cont med at current dose for now with plans to wean in future.     Orders:  -     sertraline (ZOLOFT) 50 MG tablet; Take 1 tablet (50 mg total) by mouth once daily.  Dispense: 30 tablet; Refill: 11    2. Depression, unspecified depression type  Overview:  Continue with Effexor XR 75 mg BID.  Educated patient on the risks of serotonin based medications such as serotonin modulators and SSRIs/SNRIs including common side effects of nausea, GI upset, headache dizziness as well as the rare risk for worsening symptoms of depression including development of suicidal thoughts or ideations, and serotonin syndrome.   Discussed benefits of medication not becoming noticeable until up to 6 weeks from start date.   Exercise daily. Get sunlight daily.  Practice positive phrases and repeat throughout the day, along with yoga and relaxation techniques.  Establish good social support, make changes to reduce stress.  Reports any symptoms of suicidal/homicidal ideations or self harm immediately, if clinic is closed go to nearest emergency room.    Assessment & Plan:  Controlled on current medication.   Continue treatment.     Orders:  -     sertraline (ZOLOFT) 50 MG  tablet; Take 1 tablet (50 mg total) by mouth once daily.  Dispense: 30 tablet; Refill: 11         Follow up in about 4 weeks (around 6/13/2024) for medication management and refills. . In addition to their scheduled follow up, the patient has also been instructed to follow up on as needed basis.     No future appointments.           Shireen Mohr MD

## 2024-06-12 NOTE — PROGRESS NOTES
Family Medicine    Patient ID: [id number]     Chief Complaint: Medication Refill (Medication refill and complaints of sinus cogestion.)    HPI:     Varinder Panotja Jr. is a 69 y.o. male here today for a follow up for anxiety and depression.  Started on Zoloft last month but pharmacy records show no refills yet.  He also needs to be weaned off of the Xanax    Past Medical History:   Diagnosis Date    Anemia     Chronic back pain     COPD (chronic obstructive pulmonary disease)     CTS (carpal tunnel syndrome)     Depression     Essential hypertension, benign     Folate deficiency     Gastritis     Generalized anxiety disorder     GERD (gastroesophageal reflux disease)     Hyperglycemia     Hyperlipidemia     Insomnia     Nasal sinus inflammation due to allergy     Smoker     Vitamin D deficiency         Past Surgical History:   Procedure Laterality Date    BACK SURGERY      BILATERAL INGUINAL HERNIA REPAIR      SURGICAL REMOVAL OF NODULE OF VOCAL CORD      TONSILLECTOMY          Social History     Tobacco Use    Smoking status: Every Day     Current packs/day: 0.50     Types: Cigarettes    Smokeless tobacco: Never   Substance and Sexual Activity    Alcohol use: Not Currently     Comment: Quit 10 years ago    Drug use: Never    Sexual activity: Not on file        Current Outpatient Medications   Medication Instructions    albuterol (PROVENTIL/VENTOLIN HFA) 90 mcg/actuation inhaler 1-2 puffs, Inhalation, Every 4-6 hours PRN    ALPRAZolam (XANAX) 0.5 mg, Oral, 3 times daily PRN    ascorbic acid (vitamin C) (VITAMIN C) 500 mg, Oral, Daily    atorvastatin (LIPITOR) 20 mg, Oral, Nightly    buprenorphine-naloxone 2-0.5 mg (SUBOXONE) 2-0.5 mg Film Sublingual, Daily    cetirizine (ZYRTEC) 10 mg, Oral, Daily    cyanocobalamin (VITAMIN B-12) 1,000 mcg, Oral, Daily    doxycycline (VIBRA-TABS) 100 mg, Oral, 2 times daily    esomeprazole (NEXIUM) 40 mg, Oral, 2 times daily    fluticasone-umeclidin-vilanter (TRELEGY ELLIPTA)  200-62.5-25 mcg inhaler 1 puff, Inhalation, Daily    ipratropium (ATROVENT) 21 mcg (0.03 %) nasal spray 2 sprays, Each Nostril, 3 times daily    irbesartan (AVAPRO) 150 mg, Oral, Daily    metoprolol succinate (TOPROL-XL) 50 mg, Oral, Daily    montelukast (SINGULAIR) 10 mg, Oral, Nightly    predniSONE (DELTASONE) 40 mg, Oral, Daily    QUEtiapine (SEROQUEL) 50 mg, Oral, Nightly    sertraline (ZOLOFT) 50 mg, Oral, Daily       Review of patient's allergies indicates:  No Known Allergies     Patient Care Team:  Byron Amador Sr., MD as PCP - General (Family Medicine)  Aayush Al MD (Ophthalmology)  Lennox Cabello MD as Consulting Physician (Gastroenterology)     Subjective:     Review of Systems   Constitutional:  Negative for activity change, appetite change, fever and unexpected weight change.   HENT:  Positive for congestion. Negative for dental problem, rhinorrhea and trouble swallowing.    Eyes:  Negative for visual disturbance.   Respiratory:  Positive for cough and wheezing. Negative for chest tightness and shortness of breath.    Cardiovascular:  Negative for chest pain, palpitations and leg swelling.   Gastrointestinal:  Negative for abdominal pain, blood in stool, constipation, diarrhea, nausea and vomiting.   Genitourinary:  Negative for difficulty urinating.   Musculoskeletal:  Negative for gait problem.   Skin:  Negative for rash and wound.   Neurological:  Negative for dizziness, syncope, speech difficulty, weakness and light-headedness.   Psychiatric/Behavioral:  Negative for confusion and suicidal ideas.        12 point review of systems conducted, negative except as stated in the history of present illness. See HPI for details.    Objective:     Visit Vitals  /84 (BP Location: Left arm, Patient Position: Sitting, BP Method: Medium (Manual))   Pulse 64   Temp 97.2 °F (36.2 °C) (Temporal)   Resp 18   Wt 72.7 kg (160 lb 3.2 oz)   SpO2 97%   BMI 27.93 kg/m²       Physical Exam  Vitals  and nursing note reviewed.   Constitutional:       General: He is not in acute distress.     Appearance: Normal appearance. He is not ill-appearing, toxic-appearing or diaphoretic.   HENT:      Head: Normocephalic and atraumatic.      Right Ear: Tympanic membrane, ear canal and external ear normal. There is no impacted cerumen.      Left Ear: Tympanic membrane, ear canal and external ear normal. There is no impacted cerumen.      Nose: No congestion or rhinorrhea.      Mouth/Throat:      Pharynx: Oropharynx is clear. No oropharyngeal exudate or posterior oropharyngeal erythema.   Eyes:      General:         Right eye: No discharge.         Left eye: No discharge.      Extraocular Movements: Extraocular movements intact.      Conjunctiva/sclera: Conjunctivae normal.   Cardiovascular:      Rate and Rhythm: Normal rate and regular rhythm.      Heart sounds: No murmur heard.     No friction rub. No gallop.   Pulmonary:      Effort: Pulmonary effort is normal. No respiratory distress.      Breath sounds: Wheezing present.   Musculoskeletal:      Right lower leg: No edema.      Left lower leg: No edema.   Skin:     Capillary Refill: Capillary refill takes less than 2 seconds.   Neurological:      Mental Status: He is alert and oriented to person, place, and time.   Psychiatric:         Mood and Affect: Mood normal.         Behavior: Behavior normal.         Thought Content: Thought content normal.         Labs Reviewed:     Chemistry:  Lab Results   Component Value Date     01/29/2024    K 3.8 01/29/2024    BUN 12.5 01/29/2024    CREATININE 0.82 01/29/2024    EGFRNORACEVR >60 01/29/2024    GLUCOSE 101 01/29/2024    CALCIUM 9.6 01/29/2024    ALKPHOS 115 01/29/2024    LABPROT 7.8 (H) 01/29/2024    ALBUMIN 4.1 01/29/2024    BILIDIR 0.2 09/13/2023    IBILI 0.30 09/13/2023    AST 19 01/29/2024    ALT 11 01/29/2024    [id number] 39.1 09/13/2023    TSH 1.249 01/29/2024    PSA 0.34 09/13/2023        Lab Results  "  Component Value Date    HGBA1C 5.0 09/13/2023        Hematology:  Lab Results   Component Value Date    WBC 11.30 01/29/2024    HGB 13.0 (L) 01/29/2024    HCT 41.0 (L) 01/29/2024     01/29/2024       Lipid Panel:  Lab Results   Component Value Date    CHOL 142 01/29/2024    HDL 44 01/29/2024    LDL 85.00 01/29/2024    TRIG 67 01/29/2024    TOTALCHOLEST 3 01/29/2024        Urine:  No results found for: "COLORUA", "APPEARANCEUA", "SGUA", "PHUA", "PROTEINUA", "GLUCOSEUA", "KETONESUA", "BLOODUA", "NITRITESUA", "LEUKOCYTESUR", "RBCUA", "WBCUA", "BACTERIA", "SQEPUA", "HYALINECASTS", "CREATRANDUR", "PROTEINURINE", "UPROTCREA"     Assessment:       ICD-10-CM ICD-9-CM   1. Generalized anxiety disorder  F41.1 300.02   2. Depression, unspecified depression type  F32.A 311   3. COPD exacerbation  J44.1 491.21        Plan:     1. Generalized anxiety disorder  Overview:  Prescribed Xanax 0.5 mg TID PRN anxiety.  Very anxious  Takes 3 times daily, morning noon and night  This also helps him sleep  History of opioid dependence      Assessment & Plan:  We will hold off on weaning from Xanax today  Refill Xanax 0.5 mg t.i.d. p.r.n.      Practice deep breathing or abdominal breathing exercises when anxiety occurs.  Exercise daily. Get sunlight daily.  Avoid caffeine, alcohol and stimulants.  Practice positive phrases and repeat throughout the day, along with yoga and relaxation techniques.  Set healthy boundaries, avoid people and conversations that increase stress.  Discussed benefits of medication not becoming noticeable until up to 6 weeks from start date.   Reports any symptoms of suicidal or homicidal ideations immediately, if clinic is closed go to nearest emergency room.    Orders:  -     ALPRAZolam (XANAX) 0.5 MG tablet; Take 1 tablet (0.5 mg total) by mouth 3 (three) times daily as needed for Anxiety.  Dispense: 90 tablet; Refill: 2    2. Depression, unspecified depression type  Overview:  Zoloft 50 mg daily, started " one-month ago  Stopped Effexor several months ago  Feels okay on Zoloft so far, not any better not any worse  Educated patient on the risks of serotonin based medications such as serotonin modulators and SSRIs/SNRIs including common side effects of nausea, GI upset, headache dizziness as well as the rare risk for worsening symptoms of depression including development of suicidal thoughts or ideations, and serotonin syndrome.   Discussed benefits of medication not becoming noticeable until up to 6 weeks from start date.   Exercise daily. Get sunlight daily.  Practice positive phrases and repeat throughout the day, along with yoga and relaxation techniques.  Establish good social support, make changes to reduce stress.  Reports any symptoms of suicidal/homicidal ideations or self harm immediately, if clinic is closed go to nearest emergency room.    Assessment & Plan:  Continue Zoloft 50 mg daily for 2 more months  Follow-up 2 months to determine need for titration      3. COPD exacerbation  Overview:  Nasal congestion cough and wheezing several days  Taking OTC medication  Current everyday smoker  Wheezing on exam    Assessment & Plan:  Doxycycline 100 mg twice a day for 5 days  4 days of prednisone (blood sugars okay)  Afrin for nasal congestion, discussed tolerance  He will continue taking OTC Sudafed  Return precautions give    Orders:  -     predniSONE (DELTASONE) 20 MG tablet; Take 2 tablets (40 mg total) by mouth once daily. for 4 days  Dispense: 8 tablet; Refill: 0  -     doxycycline (VIBRA-TABS) 100 MG tablet; Take 1 tablet (100 mg total) by mouth 2 (two) times daily. for 5 days  Dispense: 10 tablet; Refill: 0  -     ipratropium (ATROVENT) 21 mcg (0.03 %) nasal spray; 2 sprays by Each Nostril route 3 (three) times daily. for 5 days  Dispense: 30 mL; Refill: 0         Follow up in about 2 months (around 8/14/2024) for Follow Up anxiety. In addition to their scheduled follow up, the patient has also been  instructed to follow up on as needed basis.     No future appointments.       Raulito Alvarez MD

## 2024-06-12 NOTE — ASSESSMENT & PLAN NOTE
We will hold off on weaning from Xanax today  Refill Xanax 0.5 mg t.i.d. p.r.n.      Practice deep breathing or abdominal breathing exercises when anxiety occurs.  Exercise daily. Get sunlight daily.  Avoid caffeine, alcohol and stimulants.  Practice positive phrases and repeat throughout the day, along with yoga and relaxation techniques.  Set healthy boundaries, avoid people and conversations that increase stress.  Discussed benefits of medication not becoming noticeable until up to 6 weeks from start date.   Reports any symptoms of suicidal or homicidal ideations immediately, if clinic is closed go to nearest emergency room.

## 2024-06-14 ENCOUNTER — OFFICE VISIT (OUTPATIENT)
Dept: FAMILY MEDICINE | Facility: CLINIC | Age: 69
End: 2024-06-14
Payer: MEDICARE

## 2024-06-14 VITALS
TEMPERATURE: 97 F | HEART RATE: 64 BPM | BODY MASS INDEX: 27.93 KG/M2 | WEIGHT: 160.19 LBS | DIASTOLIC BLOOD PRESSURE: 84 MMHG | RESPIRATION RATE: 18 BRPM | SYSTOLIC BLOOD PRESSURE: 136 MMHG | OXYGEN SATURATION: 97 %

## 2024-06-14 DIAGNOSIS — F17.200 NEEDS SMOKING CESSATION EDUCATION: ICD-10-CM

## 2024-06-14 DIAGNOSIS — J44.1 COPD EXACERBATION: ICD-10-CM

## 2024-06-14 DIAGNOSIS — F41.1 GENERALIZED ANXIETY DISORDER: Primary | ICD-10-CM

## 2024-06-14 DIAGNOSIS — F32.A DEPRESSION, UNSPECIFIED DEPRESSION TYPE: ICD-10-CM

## 2024-06-14 PROCEDURE — 99214 OFFICE O/P EST MOD 30 MIN: CPT | Mod: ,,, | Performed by: FAMILY MEDICINE

## 2024-06-14 RX ORDER — DOXYCYCLINE HYCLATE 100 MG
100 TABLET ORAL 2 TIMES DAILY
Qty: 10 TABLET | Refills: 0 | Status: SHIPPED | OUTPATIENT
Start: 2024-06-14 | End: 2024-06-19

## 2024-06-14 RX ORDER — ALPRAZOLAM 0.5 MG/1
0.5 TABLET ORAL 3 TIMES DAILY PRN
Qty: 90 TABLET | Refills: 2 | Status: SHIPPED | OUTPATIENT
Start: 2024-06-14

## 2024-06-14 RX ORDER — PREDNISONE 20 MG/1
40 TABLET ORAL DAILY
Qty: 8 TABLET | Refills: 0 | Status: SHIPPED | OUTPATIENT
Start: 2024-06-14 | End: 2024-06-18

## 2024-06-14 RX ORDER — IPRATROPIUM BROMIDE 21 UG/1
2 SPRAY, METERED NASAL 3 TIMES DAILY
Qty: 30 ML | Refills: 0 | Status: SHIPPED | OUTPATIENT
Start: 2024-06-14 | End: 2024-06-19

## 2024-06-14 NOTE — ASSESSMENT & PLAN NOTE
Doxycycline 100 mg twice a day for 5 days  4 days of prednisone (blood sugars okay)  Afrin for nasal congestion, discussed tolerance  He will continue taking OTC Sudafed  Return precautions give

## 2024-08-08 NOTE — PROGRESS NOTES
Family Medicine    Patient ID: [id number]     Chief Complaint: Medication Refill (Patient here for Xanax 0.5 mg refill.)      HPI:     Varinder Pantoja Jr. is a 69 y.o. male here today for a follow up.     Past Medical History:   Diagnosis Date    Anemia     Chronic back pain     COPD (chronic obstructive pulmonary disease)     CTS (carpal tunnel syndrome)     Depression     Essential hypertension, benign     Folate deficiency     Gastritis     Generalized anxiety disorder     GERD (gastroesophageal reflux disease)     Hyperglycemia     Hyperlipidemia     Insomnia     Nasal sinus inflammation due to allergy     Smoker     Vitamin D deficiency         Past Surgical History:   Procedure Laterality Date    BACK SURGERY      BILATERAL INGUINAL HERNIA REPAIR      SURGICAL REMOVAL OF NODULE OF VOCAL CORD      TONSILLECTOMY          Social History     Tobacco Use    Smoking status: Every Day     Current packs/day: 0.50     Types: Cigarettes    Smokeless tobacco: Never   Substance and Sexual Activity    Alcohol use: Not Currently     Comment: Quit 10 years ago    Drug use: Never    Sexual activity: Not on file        Current Outpatient Medications   Medication Instructions    albuterol (PROVENTIL/VENTOLIN HFA) 90 mcg/actuation inhaler 1-2 puffs, Inhalation, Every 4-6 hours PRN    ALPRAZolam (XANAX) 0.5 mg, Oral, 3 times daily PRN    ascorbic acid (vitamin C) (VITAMIN C) 500 mg, Oral, Daily    atorvastatin (LIPITOR) 20 mg, Oral, Nightly    buprenorphine-naloxone 2-0.5 mg (SUBOXONE) 2-0.5 mg Film Sublingual, Daily    cetirizine (ZYRTEC) 10 mg, Oral, Daily    cyanocobalamin (VITAMIN B-12) 1,000 mcg, Oral, Daily    esomeprazole (NEXIUM) 40 mg, Oral, 2 times daily    fluticasone-umeclidin-vilanter (TRELEGY ELLIPTA) 200-62.5-25 mcg inhaler 1 puff, Inhalation, Daily    irbesartan (AVAPRO) 150 mg, Oral, Daily    metoprolol succinate (TOPROL-XL) 50 mg, Oral, Daily    montelukast (SINGULAIR) 10 mg, Oral, Nightly    QUEtiapine (SEROQUEL)  "50 mg, Oral, Nightly    sertraline (ZOLOFT) 50 mg, Oral, Daily       Review of patient's allergies indicates:  No Known Allergies     Patient Care Team:  Byron Amador Sr., MD as PCP - General (Family Medicine)  Aayush Al MD (Ophthalmology)  Lennox Cabello MD as Consulting Physician (Gastroenterology)     Subjective:     Review of Systems   Constitutional:  Negative for activity change, appetite change, fever and unexpected weight change.   HENT:  Negative for congestion, dental problem, rhinorrhea and trouble swallowing.    Eyes:  Negative for visual disturbance.   Respiratory:  Negative for chest tightness and shortness of breath.    Cardiovascular:  Negative for chest pain, palpitations and leg swelling.   Gastrointestinal:  Negative for abdominal pain, blood in stool, constipation, diarrhea, nausea and vomiting.   Genitourinary:  Negative for difficulty urinating.   Musculoskeletal:  Negative for gait problem.   Skin:  Negative for rash and wound.   Neurological:  Negative for dizziness, syncope, speech difficulty, weakness and light-headedness.   Psychiatric/Behavioral:  Negative for confusion and suicidal ideas.        12 point review of systems conducted, negative except as stated in the history of present illness. See HPI for details.    Objective:     Visit Vitals  BP (!) 146/74   Pulse 66   Temp 98.5 °F (36.9 °C)   Resp 20   Ht 5' 3.5" (1.613 m)   Wt 73.5 kg (162 lb)   SpO2 96%   BMI 28.25 kg/m²       Physical Exam  Vitals and nursing note reviewed.   Constitutional:       General: He is not in acute distress.     Appearance: Normal appearance. He is not ill-appearing, toxic-appearing or diaphoretic.   HENT:      Head: Normocephalic and atraumatic.      Right Ear: Tympanic membrane, ear canal and external ear normal. There is no impacted cerumen.      Left Ear: Tympanic membrane, ear canal and external ear normal. There is no impacted cerumen.      Nose: No congestion or rhinorrhea.      " "Mouth/Throat:      Pharynx: Oropharynx is clear. No oropharyngeal exudate or posterior oropharyngeal erythema.   Eyes:      General:         Right eye: No discharge.         Left eye: No discharge.      Extraocular Movements: Extraocular movements intact.      Conjunctiva/sclera: Conjunctivae normal.   Cardiovascular:      Rate and Rhythm: Normal rate and regular rhythm.      Heart sounds: No murmur heard.     No friction rub. No gallop.   Pulmonary:      Effort: Pulmonary effort is normal. No respiratory distress.      Breath sounds: Normal breath sounds.   Musculoskeletal:      Right lower leg: No edema.      Left lower leg: No edema.   Skin:     Capillary Refill: Capillary refill takes less than 2 seconds.   Neurological:      Mental Status: He is alert and oriented to person, place, and time.   Psychiatric:         Mood and Affect: Mood normal.         Behavior: Behavior normal.         Thought Content: Thought content normal.         Labs Reviewed:     Chemistry:  Lab Results   Component Value Date     01/29/2024    K 3.8 01/29/2024    BUN 12.5 01/29/2024    CREATININE 0.82 01/29/2024    EGFRNORACEVR >60 01/29/2024    GLUCOSE 101 01/29/2024    CALCIUM 9.6 01/29/2024    ALKPHOS 115 01/29/2024    LABPROT 7.8 (H) 01/29/2024    ALBUMIN 4.1 01/29/2024    BILIDIR 0.2 09/13/2023    IBILI 0.30 09/13/2023    AST 19 01/29/2024    ALT 11 01/29/2024    SMGXFOZQ27RO 39.1 09/13/2023    TSH 1.249 01/29/2024    PSA 0.34 09/13/2023        Lab Results   Component Value Date    HGBA1C 5.0 09/13/2023        Hematology:  Lab Results   Component Value Date    WBC 11.30 01/29/2024    HGB 13.0 (L) 01/29/2024    HCT 41.0 (L) 01/29/2024     01/29/2024       Lipid Panel:  Lab Results   Component Value Date    CHOL 142 01/29/2024    HDL 44 01/29/2024    LDL 85.00 01/29/2024    TRIG 67 01/29/2024    TOTALCHOLEST 3 01/29/2024        Urine:  No results found for: "COLORUA", "APPEARANCEUA", "SGUA", "PHUA", "PROTEINUA", " ""GLUCOSEUA", "KETONESUA", "BLOODUA", "NITRITESUA", "LEUKOCYTESUR", "RBCUA", "WBCUA", "BACTERIA", "SQEPUA", "HYALINECASTS", "CREATRANDUR", "PROTEINURINE", "UPROTCREA"     Assessment:       ICD-10-CM ICD-9-CM   1. Depression, unspecified depression type  F32.A 311   2. Generalized anxiety disorder  F41.1 300.02     Plan:     1. Depression, unspecified depression type  Overview:  Zoloft 50 mg daily, started one-month ago  Stopped Effexor several months ago  Feels okay on Zoloft so far, not any better not any worse  Educated patient on the risks of serotonin based medications such as serotonin modulators and SSRIs/SNRIs including common side effects of nausea, GI upset, headache dizziness as well as the rare risk for worsening symptoms of depression including development of suicidal thoughts or ideations, and serotonin syndrome.   Discussed benefits of medication not becoming noticeable until up to 6 weeks from start date.   Exercise daily. Get sunlight daily.  Practice positive phrases and repeat throughout the day, along with yoga and relaxation techniques.  Establish good social support, make changes to reduce stress.  Reports any symptoms of suicidal/homicidal ideations or self harm immediately, if clinic is closed go to nearest emergency room.    Assessment & Plan:  At goal   Continue above medication at same dose      2. Generalized anxiety disorder  Overview:  Prescribed Xanax 0.5 mg TID PRN anxiety.  Very anxious  Takes 3 times daily, morning noon and night  This also helps him sleep  History of opioid dependence  Takes buprenorphine-naloxone      Assessment & Plan:  We will hold off on weaning from Xanax today, we will leave that to PCP   Discussed dangers of taking buprenorphine/naloxone along with Xanax, such as CNS depression and risk of death  Refill Xanax 0.5 mg t.i.d. p.r.n.      Practice deep breathing or abdominal breathing exercises when anxiety occurs.  Exercise daily. Get sunlight daily.  Avoid " caffeine, alcohol and stimulants.  Practice positive phrases and repeat throughout the day, along with yoga and relaxation techniques.  Set healthy boundaries, avoid people and conversations that increase stress.  Discussed benefits of medication not becoming noticeable until up to 6 weeks from start date.   Reports any symptoms of suicidal or homicidal ideations immediately, if clinic is closed go to nearest emergency room.    Orders:  -     ALPRAZolam (XANAX) 0.5 MG tablet; Take 1 tablet (0.5 mg total) by mouth 3 (three) times daily as needed for Anxiety.  Dispense: 90 tablet; Refill: 2          Follow up in about 3 months (around 11/14/2024) for Annual, With Labs Prior to Visit. In addition to their scheduled follow up, the patient has also been instructed to follow up on as needed basis.     No future appointments.       Raulito Alvarez MD

## 2024-08-14 ENCOUNTER — OFFICE VISIT (OUTPATIENT)
Dept: FAMILY MEDICINE | Facility: CLINIC | Age: 69
End: 2024-08-14
Payer: MEDICARE

## 2024-08-14 VITALS
RESPIRATION RATE: 20 BRPM | BODY MASS INDEX: 27.66 KG/M2 | DIASTOLIC BLOOD PRESSURE: 74 MMHG | HEART RATE: 66 BPM | TEMPERATURE: 99 F | OXYGEN SATURATION: 96 % | SYSTOLIC BLOOD PRESSURE: 146 MMHG | HEIGHT: 64 IN | WEIGHT: 162 LBS

## 2024-08-14 DIAGNOSIS — F32.A DEPRESSION, UNSPECIFIED DEPRESSION TYPE: Primary | ICD-10-CM

## 2024-08-14 DIAGNOSIS — F41.1 GENERALIZED ANXIETY DISORDER: ICD-10-CM

## 2024-08-14 PROCEDURE — 99214 OFFICE O/P EST MOD 30 MIN: CPT | Mod: ,,, | Performed by: FAMILY MEDICINE

## 2024-08-14 RX ORDER — ALPRAZOLAM 0.5 MG/1
0.5 TABLET ORAL 3 TIMES DAILY PRN
Qty: 90 TABLET | Refills: 2 | Status: SHIPPED | OUTPATIENT
Start: 2024-08-14

## 2024-08-14 NOTE — ASSESSMENT & PLAN NOTE
We will hold off on weaning from Xanax today, we will leave that to PCP   Discussed dangers of taking buprenorphine/naloxone along with Xanax, such as CNS depression and risk of death  Refill Xanax 0.5 mg t.i.d. p.r.n.      Practice deep breathing or abdominal breathing exercises when anxiety occurs.  Exercise daily. Get sunlight daily.  Avoid caffeine, alcohol and stimulants.  Practice positive phrases and repeat throughout the day, along with yoga and relaxation techniques.  Set healthy boundaries, avoid people and conversations that increase stress.  Discussed benefits of medication not becoming noticeable until up to 6 weeks from start date.   Reports any symptoms of suicidal or homicidal ideations immediately, if clinic is closed go to nearest emergency room.

## 2024-08-14 NOTE — ASSESSMENT & PLAN NOTE
Pressures today not at [facility] goals  Previous pressure is normal   We will follow up in 3 months for recheck  Continue above meds at same dose

## 2024-11-08 PROCEDURE — 82306 VITAMIN D 25 HYDROXY: CPT | Performed by: FAMILY MEDICINE

## 2024-11-08 PROCEDURE — 84153 ASSAY OF PSA TOTAL: CPT | Performed by: FAMILY MEDICINE

## 2024-11-08 PROCEDURE — 83036 HEMOGLOBIN GLYCOSYLATED A1C: CPT | Performed by: FAMILY MEDICINE

## 2024-11-08 PROCEDURE — 80061 LIPID PANEL: CPT | Performed by: FAMILY MEDICINE

## 2024-11-08 PROCEDURE — 84443 ASSAY THYROID STIM HORMONE: CPT | Performed by: FAMILY MEDICINE

## 2024-11-08 PROCEDURE — 80053 COMPREHEN METABOLIC PANEL: CPT | Performed by: FAMILY MEDICINE

## 2024-11-08 PROCEDURE — 85025 COMPLETE CBC W/AUTO DIFF WBC: CPT | Performed by: FAMILY MEDICINE

## 2024-11-13 ENCOUNTER — OFFICE VISIT (OUTPATIENT)
Dept: FAMILY MEDICINE | Facility: CLINIC | Age: 69
End: 2024-11-13
Payer: MEDICARE

## 2024-11-13 VITALS
SYSTOLIC BLOOD PRESSURE: 130 MMHG | DIASTOLIC BLOOD PRESSURE: 67 MMHG | TEMPERATURE: 99 F | OXYGEN SATURATION: 95 % | HEIGHT: 64 IN | HEART RATE: 55 BPM | WEIGHT: 155 LBS | BODY MASS INDEX: 26.46 KG/M2 | RESPIRATION RATE: 18 BRPM

## 2024-11-13 DIAGNOSIS — R79.89 LOW VITAMIN D LEVEL: ICD-10-CM

## 2024-11-13 DIAGNOSIS — R20.0 NUMBNESS AND TINGLING IN LEFT HAND: ICD-10-CM

## 2024-11-13 DIAGNOSIS — E87.5 HYPERKALEMIA: ICD-10-CM

## 2024-11-13 DIAGNOSIS — D64.9 ANEMIA, UNSPECIFIED TYPE: ICD-10-CM

## 2024-11-13 DIAGNOSIS — E55.9 VITAMIN D DEFICIENCY: ICD-10-CM

## 2024-11-13 DIAGNOSIS — F41.1 GENERALIZED ANXIETY DISORDER: ICD-10-CM

## 2024-11-13 DIAGNOSIS — R20.2 NUMBNESS AND TINGLING IN LEFT HAND: ICD-10-CM

## 2024-11-13 DIAGNOSIS — Z23 NEED FOR INFLUENZA VACCINATION: Primary | ICD-10-CM

## 2024-11-13 DIAGNOSIS — Z13.6 ENCOUNTER FOR ABDOMINAL AORTIC ANEURYSM (AAA) SCREENING: ICD-10-CM

## 2024-11-13 PROCEDURE — 82607 VITAMIN B-12: CPT | Performed by: FAMILY MEDICINE

## 2024-11-13 PROCEDURE — 85045 AUTOMATED RETICULOCYTE COUNT: CPT | Performed by: FAMILY MEDICINE

## 2024-11-13 PROCEDURE — 82746 ASSAY OF FOLIC ACID SERUM: CPT | Performed by: FAMILY MEDICINE

## 2024-11-13 PROCEDURE — 83010 ASSAY OF HAPTOGLOBIN QUANT: CPT | Performed by: FAMILY MEDICINE

## 2024-11-13 PROCEDURE — 83550 IRON BINDING TEST: CPT | Performed by: FAMILY MEDICINE

## 2024-11-13 PROCEDURE — 82728 ASSAY OF FERRITIN: CPT | Performed by: FAMILY MEDICINE

## 2024-11-13 PROCEDURE — 83615 LACTATE (LD) (LDH) ENZYME: CPT | Performed by: FAMILY MEDICINE

## 2024-11-13 RX ORDER — ALPRAZOLAM 0.5 MG/1
0.5 TABLET ORAL 3 TIMES DAILY PRN
Qty: 90 TABLET | Refills: 2 | Status: SHIPPED | OUTPATIENT
Start: 2024-11-13

## 2024-11-13 NOTE — PROGRESS NOTES
Family Medicine    Patient ID: [id number]     Chief Complaint: Follow-up (Lab results), Medication Refill (Xanax), and Numbness (C/o numbness in left hand x1 year that comes and goes)      HPI:     Varinder Pantoja Jr. is a 69 y.o. male here today for a follow up.     Past Medical History:   Diagnosis Date    Anemia     Chronic back pain     COPD (chronic obstructive pulmonary disease)     CTS (carpal tunnel syndrome)     Depression     Essential hypertension, benign     Folate deficiency     Gastritis     Generalized anxiety disorder     GERD (gastroesophageal reflux disease)     Hyperglycemia     Hyperlipidemia     Insomnia     Nasal sinus inflammation due to allergy     Smoker     Vitamin D deficiency         Past Surgical History:   Procedure Laterality Date    BACK SURGERY      BILATERAL INGUINAL HERNIA REPAIR      SURGICAL REMOVAL OF NODULE OF VOCAL CORD      TONSILLECTOMY          Social History     Tobacco Use    Smoking status: Every Day     Current packs/day: 0.50     Types: Cigarettes    Smokeless tobacco: Never   Substance and Sexual Activity    Alcohol use: Not Currently     Comment: Quit 10 years ago    Drug use: Never    Sexual activity: Not on file        Current Outpatient Medications   Medication Instructions    albuterol (PROVENTIL/VENTOLIN HFA) 90 mcg/actuation inhaler 1-2 puffs, Inhalation, Every 4-6 hours PRN    ALPRAZolam (XANAX) 0.5 mg, Oral, 3 times daily PRN    ascorbic acid (vitamin C) (VITAMIN C) 500 mg, Daily    atorvastatin (LIPITOR) 20 mg, Oral, Nightly    buprenorphine-naloxone 2-0.5 mg (SUBOXONE) 2-0.5 mg Film Daily    cetirizine (ZYRTEC) 10 mg, Daily    cyanocobalamin (VITAMIN B-12) 1,000 mcg, Daily    esomeprazole (NEXIUM) 40 mg, Oral, 2 times daily    fluticasone-umeclidin-vilanter (TRELEGY ELLIPTA) 200-62.5-25 mcg inhaler 1 puff, Inhalation, Daily    irbesartan (AVAPRO) 150 mg, Oral, Daily    metoprolol succinate (TOPROL-XL) 50 mg, Oral, Daily    montelukast (SINGULAIR) 10 mg,  "Nightly    QUEtiapine (SEROQUEL) 50 mg, Nightly    sertraline (ZOLOFT) 50 mg, Oral, Daily       Review of patient's allergies indicates:  No Known Allergies     Patient Care Team:  Byron Amador Sr., MD as PCP - General (Family Medicine)  Aayush Al MD (Ophthalmology)  Lennox Cabello MD as Consulting Physician (Gastroenterology)     Subjective:     Review of Systems   Constitutional:  Negative for activity change, appetite change, fever and unexpected weight change.   HENT:  Negative for congestion, dental problem, rhinorrhea and trouble swallowing.    Eyes:  Negative for visual disturbance.   Respiratory:  Negative for chest tightness and shortness of breath.    Cardiovascular:  Negative for chest pain, palpitations and leg swelling.   Gastrointestinal:  Negative for abdominal pain, blood in stool, constipation, diarrhea, nausea and vomiting.   Genitourinary:  Negative for difficulty urinating.   Musculoskeletal:  Negative for gait problem.   Skin:  Negative for rash and wound.   Neurological:  Negative for dizziness, syncope, speech difficulty, weakness and light-headedness.   Psychiatric/Behavioral:  Negative for confusion and suicidal ideas.        12 point review of systems conducted, negative except as stated in the history of present illness. See HPI for details.    Objective:     Visit Vitals  /67 (BP Location: Left arm, Patient Position: Sitting)   Pulse (!) 55   Temp 98.5 °F (36.9 °C)   Resp 18   Ht 5' 3.5" (1.613 m)   Wt 70.3 kg (155 lb)   SpO2 95%   BMI 27.03 kg/m²       Physical Exam  Vitals and nursing note reviewed.   Constitutional:       General: He is not in acute distress.     Appearance: Normal appearance. He is not ill-appearing, toxic-appearing or diaphoretic.   HENT:      Head: Normocephalic and atraumatic.      Right Ear: Tympanic membrane, ear canal and external ear normal. There is no impacted cerumen.      Left Ear: Tympanic membrane, ear canal and external ear " normal. There is no impacted cerumen.      Nose: No congestion or rhinorrhea.      Mouth/Throat:      Pharynx: Oropharynx is clear. No oropharyngeal exudate or posterior oropharyngeal erythema.   Eyes:      General:         Right eye: No discharge.         Left eye: No discharge.      Extraocular Movements: Extraocular movements intact.      Conjunctiva/sclera: Conjunctivae normal.   Cardiovascular:      Rate and Rhythm: Normal rate and regular rhythm.      Heart sounds: No murmur heard.     No friction rub. No gallop.   Pulmonary:      Effort: Pulmonary effort is normal. No respiratory distress.      Breath sounds: Normal breath sounds.   Musculoskeletal:      Right lower leg: No edema.      Left lower leg: No edema.   Skin:     Capillary Refill: Capillary refill takes less than 2 seconds.   Neurological:      Mental Status: He is alert and oriented to person, place, and time.   Psychiatric:         Mood and Affect: Mood normal.         Behavior: Behavior normal.         Thought Content: Thought content normal.         Labs Reviewed:     Chemistry:  Lab Results   Component Value Date     11/08/2024    K 5.4 (H) 11/08/2024    BUN 14.8 11/08/2024    CREATININE 0.82 11/08/2024    EGFRNORACEVR >60 11/08/2024    GLUCOSE 86 11/08/2024    CALCIUM 9.4 11/08/2024    ALKPHOS 118 11/08/2024    LABPROT 6.9 11/08/2024    ALBUMIN 3.8 11/08/2024    BILIDIR 0.2 09/13/2023    IBILI 0.30 09/13/2023    AST 18 11/08/2024    ALT 12 11/08/2024    [id number] 31 11/08/2024    TSH 1.128 11/08/2024    PSA 0.28 11/08/2024        Lab Results   Component Value Date    HGBA1C 4.9 11/08/2024        Hematology:  Lab Results   Component Value Date    WBC 7.88 11/08/2024    HGB 12.3 (L) 11/08/2024    HCT 40.3 (L) 11/08/2024     11/08/2024       Lipid Panel:  Lab Results   Component Value Date    CHOL 114 11/08/2024    HDL 35 11/08/2024    LDL 57.00 11/08/2024    TRIG 112 11/08/2024    TOTALCHOLEST 3 11/08/2024        Urine:  No  "results found for: "COLORUA", "APPEARANCEUA", "SGUA", "PHUA", "PROTEINUA", "GLUCOSEUA", "KETONESUA", "BLOODUA", "NITRITESUA", "LEUKOCYTESUR", "RBCUA", "WBCUA", "BACTERIA", "SQEPUA", "HYALINECASTS", "CREATRANDUR", "PROTEINURINE", "UPROTCREA"     Assessment:       ICD-10-CM ICD-9-CM   1. Need for influenza vaccination  Z23 V04.81   2. Generalized anxiety disorder  F41.1 300.02   3. Hyperkalemia  E87.5 276.7   4. Anemia, unspecified type  D64.9 285.9   5. Encounter for abdominal aortic aneurysm (AAA) screening  Z13.6 V81.2   6. Low vitamin D level  R79.89 790.6   7. Vitamin D deficiency  E55.9 268.9   8. Numbness and tingling in left hand  R20.0 782.0    R20.2         Plan:     1. Need for influenza vaccination  -     influenza (adjuvanted) (Fluad) 45 mcg/0.5 mL IM vaccine (> or = 66 yo) 0.5 mL    2. Generalized anxiety disorder  Overview:  Prescribed Xanax 0.5 mg TID PRN anxiety.  Very anxious  Takes 3 times daily, morning noon and night  This also helps him sleep  History of opioid dependence  Takes buprenorphine-naloxone      Orders:  -     ALPRAZolam (XANAX) 0.5 MG tablet; Take 1 tablet (0.5 mg total) by mouth 3 (three) times daily as needed for Anxiety.  Dispense: 90 tablet; Refill: 2    3. Hyperkalemia  Overview:  5.4 today   No previous history   Takes Arb, not sure if taking quetiapine    Assessment & Plan:  No symptoms today, denies chest pain, shortness for breath, palpitations  Repeat CMP one-month, may need to make medications adjustment if not resolved    Orders:  -     Comprehensive Metabolic Panel; Future; Expected date: 12/13/2024    4. Anemia, unspecified type  Overview:  Seems to be chronic   Macrocytic but B12 and folate normal this year    Assessment & Plan:  Anemia workup    Orders:  -     Haptoglobin; Future; Expected date: 11/14/2024  -     Lactate Dehydrogenase; Future; Expected date: 11/14/2024  -     Folate; Future; Expected date: 11/14/2024  -     Vitamin B12; Future; Expected date: " 11/14/2024  -     Ferritin; Future; Expected date: 11/14/2024  -     Iron and TIBC; Future; Expected date: 11/14/2024  -     Reticulocytes; Future; Expected date: 11/14/2024  -     Path Review, Peripheral Smear; Future; Expected date: 11/14/2024  -     CBC Auto Differential; Future; Expected date: 02/13/2025  -     Comprehensive Metabolic Panel; Future; Expected date: 02/13/2025  -     Urinalysis; Future; Expected date: 02/13/2025  -     Vitamin D; Future; Expected date: 02/13/2025    5. Encounter for abdominal aortic aneurysm (AAA) screening  -     US Abdominal Aorta; Future; Expected date: 11/13/2024    6. Low vitamin D level  Overview:  Background:  Vitamin-D level today:  31  Preferred range per Endocrine Society is 40-60 ng/mL (Ref)  Current supplementation:  None    Plan:  2000 IU daily  Repeat vitamin-D in 3 months      Orders:  -     CBC Auto Differential; Future; Expected date: 02/13/2025  -     Comprehensive Metabolic Panel; Future; Expected date: 02/13/2025  -     Urinalysis; Future; Expected date: 02/13/2025  -     Vitamin D; Future; Expected date: 02/13/2025    7. Vitamin D deficiency  -     Vitamin D; Future; Expected date: 02/13/2025    8. Numbness and tingling in left hand  Overview:  Left hand numbness for about one-month in median distribution    Assessment & Plan:  Motor and sensory normal on exam   Both Tinel's and Phalen's positive   Recommend wrist splint for 6 weeks   Follow up if no better         Accepted flu, declines others play   AAA accepted we will do a new [place]    Follow up for One-month for repeat lab, three-month visit with lab. In addition to their scheduled follow up, the patient has also been instructed to follow up on as needed basis.     No future appointments.     Raulito Alvarez MD

## 2024-11-13 NOTE — ASSESSMENT & PLAN NOTE
Motor and sensory normal on exam   Both Tinel's and Phalen's positive   Recommend wrist splint for 6 weeks   Follow up if no better

## 2024-11-13 NOTE — ASSESSMENT & PLAN NOTE
No symptoms today, denies chest pain, shortness for breath, palpitations  Repeat CMP one-month, may need to make medications adjustment if not resolved

## 2024-11-14 LAB
FERRITIN SERPL-MCNC: 84.14 NG/ML (ref 21.81–274.66)
FOLATE SERPL-MCNC: 5.5 NG/ML (ref 7–31.4)
HAPTOGLOB SERPL-MCNC: 178 MG/DL (ref 40–368)
IRON SATN MFR SERPL: 18 % (ref 20–50)
IRON SERPL-MCNC: 55 UG/DL (ref 65–175)
LDH SERPL-CCNC: 249 U/L (ref 125–220)
RET# (OHS): 0.06 X10E6/UL (ref 0.03–0.1)
RETICULOCYTE COUNT AUTOMATED (OLG): 1.67 % (ref 1.1–2.1)
TIBC SERPL-MCNC: 246 UG/DL (ref 60–240)
TIBC SERPL-MCNC: 301 UG/DL (ref 250–450)
TRANSFERRIN SERPL-MCNC: 274 MG/DL (ref 163–344)
VIT B12 SERPL-MCNC: 786 PG/ML (ref 213–816)

## 2024-11-15 LAB — HEMATOLOGIST REVIEW: NORMAL

## 2024-11-19 ENCOUNTER — TELEPHONE (OUTPATIENT)
Dept: FAMILY MEDICINE | Facility: CLINIC | Age: 69
End: 2024-11-19

## 2024-11-19 NOTE — TELEPHONE ENCOUNTER
----- Message from Raulito Alvarez MD sent at 11/15/2024  6:25 PM CST -----  Low folate.  Recommend supplementing with OTC folate 400 mcg daily.  Repeat CBC is already ordered for February.  Also low iron.  Please call patient make sure he is not having any bloody or tarry stools.  Recommend starting OTC iron supplementation daily with no food as well.

## 2024-11-19 NOTE — TELEPHONE ENCOUNTER
Patient notified of lab results as per Dr Alvarez. Repeat lab on 2/17/2024, Folate, Iron and TIBC.

## 2024-12-09 ENCOUNTER — TELEPHONE (OUTPATIENT)
Dept: FAMILY MEDICINE | Facility: CLINIC | Age: 69
End: 2024-12-09
Payer: MEDICARE

## 2024-12-09 NOTE — TELEPHONE ENCOUNTER
Spoke with patient and confirmed . Went over results with pt and he denied any pain. Pt had never been told about the hole in his diaphragm but confirmed understanding about it. Confirmed lab appt for 24.

## 2024-12-09 NOTE — TELEPHONE ENCOUNTER
----- Message from Raulito Alvarez MD sent at 12/6/2024  7:07 PM CST -----  1. No suspicious findings in lungs.  Repeat low-dose CT in 1 year  2. 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  Please ask her he is having any abdominal pain or chest pain from this which I suspect he is not.  If he is having such problems please make an appointment so we can evaluate it   3. Patient has moderate COPD from smoking.

## 2024-12-12 ENCOUNTER — TELEPHONE (OUTPATIENT)
Dept: FAMILY MEDICINE | Facility: CLINIC | Age: 69
End: 2024-12-12

## 2024-12-12 PROCEDURE — 80053 COMPREHEN METABOLIC PANEL: CPT | Performed by: FAMILY MEDICINE

## 2024-12-12 NOTE — TELEPHONE ENCOUNTER
----- Message from Raulito Alvarez MD sent at 12/12/2024 11:18 AM CST -----  Elevated potassium resolved.  No further management

## 2025-01-07 ENCOUNTER — DOCUMENTATION ONLY (OUTPATIENT)
Dept: FAMILY MEDICINE | Facility: CLINIC | Age: 70
End: 2025-01-07
Payer: MEDICARE

## 2025-01-07 ENCOUNTER — TELEPHONE (OUTPATIENT)
Dept: FAMILY MEDICINE | Facility: CLINIC | Age: 70
End: 2025-01-07
Payer: MEDICARE

## 2025-01-07 NOTE — LETTER
AUTHORIZATION FOR RELEASE OF   CONFIDENTIAL INFORMATION    Dear Medical Records,    We are seeing Varinder Pantoja Jr., date of birth 1955, in the clinic at [facility]. Byron Amador Sr., MD is the patient's PCP. Varinder Pantoja Jr. has an outstanding lab/procedure at the time we reviewed his chart. In order to help keep his health information updated, he has authorized us to request the following medical record(s):        (  )  MAMMOGRAM                                      (  )  COLONOSCOPY      (  )  PAP SMEAR                                          (  )  OUTSIDE LAB RESULTS     (  )  DEXA SCAN                                          (  )  EYE EXAM            (  )  FOOT EXAM                                          ( x )  ENTIRE RECORD     (  )  OUTSIDE IMMUNIZATIONS                 (  )  _______________         Please fax records to [facility], Bourgeois, Leonard Jb. Sr., MD, [phone]     If you have any questions, please contact Michelle Watson LPN at [phone].           Patient Name: Varinder Pantoja Jr.  : 1955  Patient Phone #: [phone]

## 2025-01-08 ENCOUNTER — TELEPHONE (OUTPATIENT)
Dept: FAMILY MEDICINE | Facility: CLINIC | Age: 70
End: 2025-01-08
Payer: MEDICARE

## 2025-01-08 NOTE — TELEPHONE ENCOUNTER
----- Message from Raultio Alvarez MD sent at 1/7/2025  4:51 PM CST -----  Unchanged lung nodule found.  Repeat 1 year.  Patient also has moderate emphysema based upon the lung exam today.  Please ask patient to follow-up with medical care if he experiences shortness for breath, dyspnea on exertion, or wheezing

## 2025-01-09 ENCOUNTER — TELEPHONE (OUTPATIENT)
Dept: FAMILY MEDICINE | Facility: CLINIC | Age: 70
End: 2025-01-09
Payer: MEDICARE

## 2025-01-09 NOTE — TELEPHONE ENCOUNTER
----- Message from Raulito Alvarez MD sent at 1/9/2025  8:07 AM CST -----  Normal AAA screening.  No further evaluation necessary.

## 2025-02-03 DIAGNOSIS — K63.89 MASS OF COLON: Primary | ICD-10-CM

## 2025-02-17 DIAGNOSIS — I10 ESSENTIAL HYPERTENSION, BENIGN: ICD-10-CM

## 2025-02-17 DIAGNOSIS — E78.2 MIXED HYPERLIPIDEMIA: ICD-10-CM

## 2025-02-17 PROCEDURE — 82306 VITAMIN D 25 HYDROXY: CPT | Performed by: FAMILY MEDICINE

## 2025-02-17 PROCEDURE — 80053 COMPREHEN METABOLIC PANEL: CPT | Performed by: FAMILY MEDICINE

## 2025-02-17 PROCEDURE — 85025 COMPLETE CBC W/AUTO DIFF WBC: CPT | Performed by: FAMILY MEDICINE

## 2025-02-17 RX ORDER — METOPROLOL SUCCINATE 50 MG/1
50 TABLET, EXTENDED RELEASE ORAL DAILY
Qty: 90 TABLET | Refills: 3 | Status: SHIPPED | OUTPATIENT
Start: 2025-02-17

## 2025-02-17 RX ORDER — ATORVASTATIN CALCIUM 20 MG/1
20 TABLET, FILM COATED ORAL NIGHTLY
Qty: 90 TABLET | Refills: 3 | Status: SHIPPED | OUTPATIENT
Start: 2025-02-17

## 2025-02-17 RX ORDER — IRBESARTAN 150 MG/1
150 TABLET ORAL DAILY
Qty: 90 TABLET | Refills: 3 | Status: SHIPPED | OUTPATIENT
Start: 2025-02-17

## 2025-02-18 ENCOUNTER — RESULTS FOLLOW-UP (OUTPATIENT)
Dept: PRIMARY CARE CLINIC | Facility: CLINIC | Age: 70
End: 2025-02-18

## 2025-02-19 ENCOUNTER — OFFICE VISIT (OUTPATIENT)
Dept: FAMILY MEDICINE | Facility: CLINIC | Age: 70
End: 2025-02-19
Payer: MEDICARE

## 2025-02-19 VITALS
TEMPERATURE: 99 F | SYSTOLIC BLOOD PRESSURE: 138 MMHG | HEART RATE: 47 BPM | OXYGEN SATURATION: 99 % | HEIGHT: 64 IN | WEIGHT: 157.81 LBS | RESPIRATION RATE: 18 BRPM | BODY MASS INDEX: 26.94 KG/M2 | DIASTOLIC BLOOD PRESSURE: 68 MMHG

## 2025-02-19 DIAGNOSIS — R79.9 ABNORMAL FINDING OF BLOOD CHEMISTRY, UNSPECIFIED: ICD-10-CM

## 2025-02-19 DIAGNOSIS — E58 INADEQUATE INTAKE OF CALCIUM AND VITAMIN D: ICD-10-CM

## 2025-02-19 DIAGNOSIS — E78.2 MIXED HYPERLIPIDEMIA: ICD-10-CM

## 2025-02-19 DIAGNOSIS — F41.1 GENERALIZED ANXIETY DISORDER: ICD-10-CM

## 2025-02-19 DIAGNOSIS — D64.9 ANEMIA, UNSPECIFIED TYPE: Primary | ICD-10-CM

## 2025-02-19 DIAGNOSIS — E55.9 INADEQUATE INTAKE OF CALCIUM AND VITAMIN D: ICD-10-CM

## 2025-02-19 DIAGNOSIS — I10 ESSENTIAL HYPERTENSION, BENIGN: ICD-10-CM

## 2025-02-19 DIAGNOSIS — J44.9 CHRONIC OBSTRUCTIVE PULMONARY DISEASE, UNSPECIFIED COPD TYPE: ICD-10-CM

## 2025-02-19 RX ORDER — LANOLIN ALCOHOL/MO/W.PET/CERES
1 CREAM (GRAM) TOPICAL DAILY
COMMUNITY

## 2025-02-19 RX ORDER — FOLIC ACID 0.4 MG
400 TABLET ORAL DAILY
COMMUNITY

## 2025-02-19 RX ORDER — ARIPIPRAZOLE 5 MG/1
5 TABLET ORAL DAILY
COMMUNITY
Start: 2025-01-28

## 2025-02-19 NOTE — ASSESSMENT & PLAN NOTE
No change in hemoglobin today   MCV worsened from 101-105  Double folate intake   Repeat all labs three-month

## 2025-02-19 NOTE — PROGRESS NOTES
Family Medicine    Patient ID: [id number]     Chief Complaint: Follow-up (Lab results) and Medication Refill (Xanax)      HPI:     Varinder Pantoja Jr. is a 69 y.o. male here today for anemia.  This appears to be chronic and macrocytic.  Previously B12 normal and folate low so he has been supplementing with 400 mcg of folate daily.  Iron was low as well as he has been taking iron tablet daily.  Fortunately his hemoglobin levels did not improve.  He denies any bloody or tarry stools.  We will double up on his folate and retest all labs in three-month    He also wants to find out if there is a cheaper COPD maintenance inhaler.  He will get with his ensure and if there is 1 he will call us and requests that prescription.    Past Medical History:   Diagnosis Date    Anemia     Chronic back pain     COPD (chronic obstructive pulmonary disease)     CTS (carpal tunnel syndrome)     Depression     Essential hypertension, benign     Folate deficiency     Gastritis     Generalized anxiety disorder     GERD (gastroesophageal reflux disease)     Hyperglycemia     Hyperlipidemia     Insomnia     Nasal sinus inflammation due to allergy     Smoker     Vitamin D deficiency         Past Surgical History:   Procedure Laterality Date    BACK SURGERY      BILATERAL INGUINAL HERNIA REPAIR      SURGICAL REMOVAL OF NODULE OF VOCAL CORD      TONSILLECTOMY          Social History     Tobacco Use    Smoking status: Every Day     Current packs/day: 2.00     Average packs/day: 2.0 packs/day for 40.2 years (80.5 ttl pk-yrs)     Types: Cigarettes     Start date: 11/26/1984    Smokeless tobacco: Never   Substance and Sexual Activity    Alcohol use: Not Currently     Comment: Quit 10 years ago    Drug use: Never    Sexual activity: Not on file        Current Outpatient Medications   Medication Instructions    albuterol (PROVENTIL/VENTOLIN HFA) 90 mcg/actuation inhaler 1-2 puffs, Inhalation, Every 4-6 hours PRN    ALPRAZolam (XANAX) 0.5 mg, Oral, 3  times daily PRN    ARIPiprazole (ABILIFY) 5 mg, Daily    ascorbic acid (vitamin C) (VITAMIN C) 500 mg, Daily    atorvastatin (LIPITOR) 20 mg, Oral, Nightly    buprenorphine-naloxone 2-0.5 mg (SUBOXONE) 2-0.5 mg Film Daily    cetirizine (ZYRTEC) 10 mg, Daily    cyanocobalamin (VITAMIN B-12) 1,000 mcg, Daily    esomeprazole (NEXIUM) 40 mg, Oral, 2 times daily    ferrous sulfate (FEOSOL) Tab tablet 1 tablet, Daily    fluticasone-umeclidin-vilanter (TRELEGY ELLIPTA) 200-62.5-25 mcg inhaler 1 puff, Inhalation, Daily    folic acid (FOLVITE) 400 mcg, Daily    irbesartan (AVAPRO) 150 mg, Oral, Daily    metoprolol succinate (TOPROL-XL) 50 mg, Oral, Daily    montelukast (SINGULAIR) 10 mg, Nightly    QUEtiapine (SEROQUEL) 50 mg, Nightly    sertraline (ZOLOFT) 50 mg, Oral, Daily       Review of patient's allergies indicates:  No Known Allergies     Patient Care Team:  Byron Amador Sr., MD as PCP - General (Family Medicine)  Aayush Al MD (Ophthalmology)  Lennox Cabello MD as Consulting Physician (Gastroenterology)     Subjective:     Review of Systems   Constitutional:  Negative for activity change, appetite change, fever and unexpected weight change.   HENT:  Negative for congestion, dental problem, rhinorrhea and trouble swallowing.    Eyes:  Negative for visual disturbance.   Respiratory:  Negative for chest tightness and shortness of breath.    Cardiovascular:  Negative for chest pain, palpitations and leg swelling.   Gastrointestinal:  Negative for abdominal pain, blood in stool, constipation, diarrhea, nausea and vomiting.   Genitourinary:  Negative for difficulty urinating.   Musculoskeletal:  Negative for gait problem.   Skin:  Negative for rash and wound.   Neurological:  Negative for dizziness, syncope, speech difficulty, weakness and light-headedness.   Psychiatric/Behavioral:  Negative for confusion and suicidal ideas.        12 point review of systems conducted, negative except as stated in the  "history of present illness. See HPI for details.    Objective:     Visit Vitals  /68 (BP Location: Left arm, Patient Position: Sitting)   Pulse (!) 47   Temp 98.9 °F (37.2 °C)   Resp 18   Ht 5' 3.5" (1.613 m)   Wt 71.6 kg (157 lb 12.8 oz)   SpO2 99%   BMI 27.51 kg/m²       Physical Exam  Vitals and nursing note reviewed.   Constitutional:       General: He is not in acute distress.     Appearance: Normal appearance. He is not ill-appearing, toxic-appearing or diaphoretic.   HENT:      Head: Normocephalic and atraumatic.      Right Ear: Tympanic membrane, ear canal and external ear normal. There is no impacted cerumen.      Left Ear: Tympanic membrane, ear canal and external ear normal. There is no impacted cerumen.      Nose: No congestion or rhinorrhea.      Mouth/Throat:      Pharynx: Oropharynx is clear. No oropharyngeal exudate or posterior oropharyngeal erythema.   Eyes:      General:         Right eye: No discharge.         Left eye: No discharge.      Extraocular Movements: Extraocular movements intact.      Conjunctiva/sclera: Conjunctivae normal.   Cardiovascular:      Rate and Rhythm: Normal rate and regular rhythm.      Heart sounds: No murmur heard.     No friction rub. No gallop.   Pulmonary:      Effort: Pulmonary effort is normal. No respiratory distress.      Breath sounds: Normal breath sounds.   Musculoskeletal:      Right lower leg: No edema.      Left lower leg: No edema.   Skin:     Capillary Refill: Capillary refill takes less than 2 seconds.   Neurological:      Mental Status: He is alert and oriented to person, place, and time.   Psychiatric:         Mood and Affect: Mood normal.         Behavior: Behavior normal.         Thought Content: Thought content normal.         Labs Reviewed:     Chemistry:  Lab Results   Component Value Date     02/17/2025    K 5.0 02/17/2025    BUN 20.2 02/17/2025    CREATININE 0.97 02/17/2025    EGFRNORACEVR >60 02/17/2025    GLUCOSE 88 02/17/2025    " "CALCIUM 9.1 02/17/2025    ALKPHOS 117 02/17/2025    LABPROT 7.1 02/17/2025    ALBUMIN 3.7 02/17/2025    BILIDIR 0.2 09/13/2023    IBILI 0.30 09/13/2023    AST 20 02/17/2025    ALT 15 02/17/2025    [id number] 34 02/17/2025    TSH 1.128 11/08/2024    PSA 0.28 11/08/2024        Lab Results   Component Value Date    HGBA1C 4.9 11/08/2024        Hematology:  Lab Results   Component Value Date    WBC 11.44 02/17/2025    HGB 12.4 (L) 02/17/2025    HCT 40.2 (L) 02/17/2025     02/17/2025       Lipid Panel:  Lab Results   Component Value Date    CHOL 114 11/08/2024    HDL 35 11/08/2024    LDL 57.00 11/08/2024    TRIG 112 11/08/2024    TOTALCHOLEST 3 11/08/2024        Urine:  No results found for: "COLORUA", "APPEARANCEUA", "SGUA", "PHUA", "PROTEINUA", "GLUCOSEUA", "KETONESUA", "BLOODUA", "NITRITESUA", "LEUKOCYTESUR", "RBCUA", "WBCUA", "BACTERIA", "SQEPUA", "HYALINECASTS", "CREATRANDUR", "PROTEINURINE", "UPROTCREA"     Assessment:       ICD-10-CM ICD-9-CM   1. Anemia, unspecified type  D64.9 285.9   2. Chronic obstructive pulmonary disease, unspecified COPD type  J44.9 496   3. Mixed hyperlipidemia  E78.2 272.2   4. Essential hypertension, benign  I10 401.1   5. Generalized anxiety disorder  F41.1 300.02   6. Abnormal finding of blood chemistry, unspecified  R79.9 790.6   7. Inadequate intake of calcium and vitamin D  E58 269.3    E55.9 268.9        Plan:     1. Anemia, unspecified type  Overview:  Chronic microcytic anemia  Folate 400 mcg daily   Iron tablets daily    Assessment & Plan:  No change in hemoglobin today   MCV worsened from 101-105  Double folate intake   Repeat all labs three-month    Orders:  -     CBC Auto Differential; Future; Expected date: 05/19/2025  -     Comprehensive Metabolic Panel; Future; Expected date: 05/19/2025  -     Lipid Panel; Future; Expected date: 05/19/2025  -     TSH; Future; Expected date: 05/19/2025  -     Hemoglobin A1C; Future; Expected date: 05/19/2025  -     Urinalysis; " Future; Expected date: 05/19/2025  -     Microalbumin/Creatinine Ratio, Urine; Future; Expected date: 05/19/2025  -     Vitamin D; Future; Expected date: 05/19/2025  -     Folate; Future; Expected date: 05/19/2025  -     Iron and TIBC; Future; Expected date: 05/19/2025  -     Ferritin; Future; Expected date: 05/19/2025  -     Vitamin B12; Future; Expected date: 05/19/2025    2. Chronic obstructive pulmonary disease, unspecified COPD type  Overview:  Continue with Ventolin HFA 90 mcg 1-2 puffs Q4-6 hours PRN SOB, cough, or wheezing + Trelegy Ellipta 200-62.5-25 mcg 1 puff daily.  Avoid/ limit triggers such as pollen ,dust ,mold and animal dander.  Avoid smoke ,strong chemicals and strong cleaning products in addition to strong perfumes and colognes.  Use rescue inhaler or nebulizer when needed for wheezing or shortness of breath.  Report any respiratory tract infections and seek treatment quickly.  If on a steroid maintenance inhaler this should be used every day.  If indicated weight loss is recommended.    Assessment & Plan:  Well-controlled   Continue current medication at same dose      3. Mixed hyperlipidemia  Overview:  Hyperlipidemia Medications               atorvastatin (LIPITOR) 20 MG tablet Take 1 tablet (20 mg total) by mouth every evening.        Stressed importance of dietary modifications. Follow a low cholesterol, low saturated fat diet with less that 200mg of cholesterol a day.  Avoid fried foods and high saturated fats (high saturated fats less than 7% of calories).  Add Flax Seed/Fish Oil supplements to diet. Increase dietary fiber.  Regular exercise can reduce LDL and raise HDL. Stressed importance of physical activity 5 times per week for 30 minutes per day.     Assessment & Plan:  Previously at goal   Repeat three-month    Orders:  -     CBC Auto Differential; Future; Expected date: 05/19/2025  -     Comprehensive Metabolic Panel; Future; Expected date: 05/19/2025  -     Lipid Panel; Future;  Expected date: 05/19/2025  -     TSH; Future; Expected date: 05/19/2025  -     Hemoglobin A1C; Future; Expected date: 05/19/2025  -     Urinalysis; Future; Expected date: 05/19/2025  -     Microalbumin/Creatinine Ratio, Urine; Future; Expected date: 05/19/2025  -     Vitamin D; Future; Expected date: 05/19/2025  -     Folate; Future; Expected date: 05/19/2025  -     Iron and TIBC; Future; Expected date: 05/19/2025  -     Ferritin; Future; Expected date: 05/19/2025    4. Essential hypertension, benign  Overview:  Hypertension Medications               irbesartan (AVAPRO) 150 MG tablet Take 1 tablet (150 mg total) by mouth once daily.    metoprolol succinate (TOPROL-XL) 50 MG 24 hr tablet Take 1 tablet (50 mg total) by mouth once daily.        Low Sodium Diet (DASH Diet - Less than 2 grams of sodium per day).  Monitor blood pressure daily and log. Report consistent numbers greater than 140/90.  Maintain healthy weight with goal BMI <30. Exercise 30 minutes per day, 5 days per week.    Assessment & Plan:  At goal   Continue above medication at same dose    Orders:  -     CBC Auto Differential; Future; Expected date: 05/19/2025  -     Comprehensive Metabolic Panel; Future; Expected date: 05/19/2025  -     Lipid Panel; Future; Expected date: 05/19/2025  -     TSH; Future; Expected date: 05/19/2025  -     Hemoglobin A1C; Future; Expected date: 05/19/2025  -     Urinalysis; Future; Expected date: 05/19/2025  -     Microalbumin/Creatinine Ratio, Urine; Future; Expected date: 05/19/2025  -     Vitamin D; Future; Expected date: 05/19/2025  -     Folate; Future; Expected date: 05/19/2025  -     Iron and TIBC; Future; Expected date: 05/19/2025  -     Ferritin; Future; Expected date: 05/19/2025    5. Generalized anxiety disorder  Overview:  Prescribed Xanax 0.5 mg TID PRN anxiety.  Very anxious  Takes 3 times daily, morning noon and night  This also helps him sleep  History of opioid dependence  Takes  buprenorphine-naloxone      Assessment & Plan:  We will hold off on weaning from Xanax today, we will leave that to PCP   Discussed dangers of taking buprenorphine/naloxone along with Xanax, such as CNS depression and risk of death  Refill Xanax 0.5 mg t.i.d. p.r.n.      Practice deep breathing or abdominal breathing exercises when anxiety occurs.  Exercise daily. Get sunlight daily.  Avoid caffeine, alcohol and stimulants.  Practice positive phrases and repeat throughout the day, along with yoga and relaxation techniques.  Set healthy boundaries, avoid people and conversations that increase stress.  Discussed benefits of medication not becoming noticeable until up to 6 weeks from start date.   Reports any symptoms of suicidal or homicidal ideations immediately, if clinic is closed go to nearest emergency room.    Orders:  -     CBC Auto Differential; Future; Expected date: 05/19/2025  -     Comprehensive Metabolic Panel; Future; Expected date: 05/19/2025  -     Lipid Panel; Future; Expected date: 05/19/2025  -     TSH; Future; Expected date: 05/19/2025  -     Hemoglobin A1C; Future; Expected date: 05/19/2025  -     Urinalysis; Future; Expected date: 05/19/2025  -     Microalbumin/Creatinine Ratio, Urine; Future; Expected date: 05/19/2025  -     Vitamin D; Future; Expected date: 05/19/2025  -     Folate; Future; Expected date: 05/19/2025  -     Iron and TIBC; Future; Expected date: 05/19/2025  -     Ferritin; Future; Expected date: 05/19/2025    6. Abnormal finding of blood chemistry, unspecified  -     Hemoglobin A1C; Future; Expected date: 05/19/2025    7. Inadequate intake of calcium and vitamin D  -     Vitamin D; Future; Expected date: 05/19/2025         Follow up in about 2 months (around 4/19/2025) for Follow Up, With Labs Prior to Visit. In addition to their scheduled follow up, the patient has also been instructed to follow up on as needed basis.     Future Appointments   Date Time Provider Department  [place]   4/17/2025 10:00 AM LAB, [facility] FAM[facility] MED [facility] DUANE Rossi   5/28/2025  3:40 PM PROVIDER, [facility] FAMILY MEDICINE [facility] DUANE Alvarez MD

## 2025-02-26 DIAGNOSIS — F41.1 GENERALIZED ANXIETY DISORDER: ICD-10-CM

## 2025-02-26 RX ORDER — ALPRAZOLAM 0.5 MG/1
0.5 TABLET ORAL 3 TIMES DAILY PRN
Qty: 90 TABLET | Refills: 2 | Status: SHIPPED | OUTPATIENT
Start: 2025-02-27

## 2025-05-06 PROCEDURE — 82607 VITAMIN B-12: CPT | Performed by: FAMILY MEDICINE

## 2025-05-06 PROCEDURE — 82746 ASSAY OF FOLIC ACID SERUM: CPT | Performed by: FAMILY MEDICINE

## 2025-05-06 PROCEDURE — 83540 ASSAY OF IRON: CPT | Performed by: FAMILY MEDICINE

## 2025-05-06 PROCEDURE — 84443 ASSAY THYROID STIM HORMONE: CPT | Performed by: FAMILY MEDICINE

## 2025-05-06 PROCEDURE — 82728 ASSAY OF FERRITIN: CPT | Performed by: FAMILY MEDICINE

## 2025-05-06 PROCEDURE — 80053 COMPREHEN METABOLIC PANEL: CPT | Performed by: FAMILY MEDICINE

## 2025-05-06 PROCEDURE — 85025 COMPLETE CBC W/AUTO DIFF WBC: CPT | Performed by: FAMILY MEDICINE

## 2025-05-06 PROCEDURE — 80061 LIPID PANEL: CPT | Performed by: FAMILY MEDICINE

## 2025-05-06 PROCEDURE — 82306 VITAMIN D 25 HYDROXY: CPT | Performed by: FAMILY MEDICINE

## 2025-05-06 PROCEDURE — 83036 HEMOGLOBIN GLYCOSYLATED A1C: CPT | Performed by: FAMILY MEDICINE

## 2025-05-08 ENCOUNTER — OFFICE VISIT (OUTPATIENT)
Dept: FAMILY MEDICINE | Facility: CLINIC | Age: 70
End: 2025-05-08
Payer: MEDICARE

## 2025-05-08 VITALS
SYSTOLIC BLOOD PRESSURE: 162 MMHG | RESPIRATION RATE: 20 BRPM | BODY MASS INDEX: 26.32 KG/M2 | HEIGHT: 64 IN | WEIGHT: 154.19 LBS | DIASTOLIC BLOOD PRESSURE: 82 MMHG | HEART RATE: 46 BPM

## 2025-05-08 DIAGNOSIS — R07.9 CHEST PAIN, UNSPECIFIED TYPE: ICD-10-CM

## 2025-05-08 DIAGNOSIS — F41.1 GENERALIZED ANXIETY DISORDER: ICD-10-CM

## 2025-05-08 DIAGNOSIS — J44.1 CHRONIC OBSTRUCTIVE PULMONARY DISEASE WITH ACUTE EXACERBATION: Primary | ICD-10-CM

## 2025-05-08 PROBLEM — I20.89 ANGINA AT REST: Status: ACTIVE | Noted: 2025-05-08

## 2025-05-08 PROCEDURE — 99214 OFFICE O/P EST MOD 30 MIN: CPT | Mod: ,,,

## 2025-05-08 RX ORDER — ALPRAZOLAM 0.5 MG/1
0.5 TABLET ORAL 3 TIMES DAILY PRN
Qty: 90 TABLET | Refills: 2 | Status: SHIPPED | OUTPATIENT
Start: 2025-05-08

## 2025-05-08 RX ORDER — PREDNISONE 20 MG/1
20 TABLET ORAL 2 TIMES DAILY
Qty: 10 TABLET | Refills: 0 | Status: SHIPPED | OUTPATIENT
Start: 2025-05-08 | End: 2025-05-13

## 2025-05-08 RX ORDER — SERTRALINE HYDROCHLORIDE 100 MG/1
100 TABLET, FILM COATED ORAL 2 TIMES DAILY
COMMUNITY
Start: 2025-04-10

## 2025-05-08 RX ORDER — IPRATROPIUM BROMIDE AND ALBUTEROL SULFATE 2.5; .5 MG/3ML; MG/3ML
3 SOLUTION RESPIRATORY (INHALATION) EVERY 6 HOURS PRN
Qty: 75 ML | Refills: 3 | Status: SHIPPED | OUTPATIENT
Start: 2025-05-08 | End: 2026-05-08

## 2025-05-08 NOTE — ASSESSMENT & PLAN NOTE
Discussed weaning from Xanax in the future, but we will leave this to his PCP.  Discussed dangers of taking buprenorphine/naloxone along with Xanax, such as CNS depression and risk of death  Refill Xanax 0.5 mg t.i.d. p.r.n.      Practice deep breathing or abdominal breathing exercises when anxiety occurs.  Exercise daily. Get sunlight daily.  Avoid caffeine, alcohol and stimulants.  Practice positive phrases and repeat throughout the day, along with yoga and relaxation techniques.  Set healthy boundaries, avoid people and conversations that increase stress.  Discussed benefits of medication not becoming noticeable until up to 6 weeks from start date.   Reports any symptoms of suicidal or homicidal ideations immediately, if clinic is closed go to nearest emergency room.

## 2025-05-08 NOTE — ASSESSMENT & PLAN NOTE
Stable mildly decreased hemoglobin.  Folate levels have improved.  Still iron deficient.  Patient has not been on his iron because he ran out.  Repeat CBC and iron studies in 3 months.

## 2025-05-08 NOTE — PROGRESS NOTES
[facility]  Natalee Haddad MD    Patient ID: [id number]     Chief Complaint: Results (Patient here for lab results. ), Medication Refill (Patient request refill on Xanax.), and Chest Pain (Patient complains of chest pain  off and on.)      HPI:     Varinder Pantoja Jr. is a 70 y.o. male here today for follow up of chronic conditions.    Patient has a history of macrocytic anemia with iron, B12, and folate deficiencies.  Normal B12 and folate levels.  Stable iron-deficiency on labs.  Hemoglobin 12.8.  He is currently taking folic acid 800 mcg daily, ferrous sulfate, and B12 1000 mcg daily  Patient has been out of his iron for few weeks.    Patient reports increased cough with clear sputum for the last month.  He has shortness of breath is also slightly worsened.  Patient is still smoking.  No fevers or chills.    He has had intermittent left-sided chest pain with exertion and at rest or the last several months.  No chest pain today.  Episodes last a few minutes.            Past Medical History:   Diagnosis Date    Anemia     Chronic back pain     COPD (chronic obstructive pulmonary disease)     CTS (carpal tunnel syndrome)     Depression     Essential hypertension, benign     Folate deficiency     Gastritis     Generalized anxiety disorder     GERD (gastroesophageal reflux disease)     Hyperglycemia     Hyperlipidemia     Insomnia     Nasal sinus inflammation due to allergy     Smoker     Vitamin D deficiency         Past Surgical History:   Procedure Laterality Date    BACK SURGERY      BILATERAL INGUINAL HERNIA REPAIR      SURGICAL REMOVAL OF NODULE OF VOCAL CORD      TONSILLECTOMY          Social History     Tobacco Use    Smoking status: Every Day     Current packs/day: 2.00     Average packs/day: 2.0 packs/day for 40.4 years (80.9 ttl pk-yrs)     Types: Cigarettes     Start date: 11/26/1984    Smokeless tobacco: Never   Substance and Sexual Activity    Alcohol use: Not Currently     Comment: Quit 10  "years ago    Drug use: Never    Sexual activity: Not on file        Current Outpatient Medications   Medication Instructions    albuterol (PROVENTIL/VENTOLIN HFA) 90 mcg/actuation inhaler 1-2 puffs, Inhalation, Every 4-6 hours PRN    albuterol-ipratropium (DUO-NEB) 2.5 mg-0.5 mg/3 mL nebulizer solution 3 mLs, Nebulization, Every 6 hours PRN, Rescue    ALPRAZolam (XANAX) 0.5 mg, Oral, 3 times daily PRN    ARIPiprazole (ABILIFY) 5 mg, Daily    ascorbic acid (vitamin C) (VITAMIN C) 500 mg, Daily    atorvastatin (LIPITOR) 20 mg, Oral, Nightly    budesonide-glycopyr-formoterol 160-9-4.8 mcg/actuation HFAA 2 puffs, Inhalation, Daily    buprenorphine-naloxone 2-0.5 mg (SUBOXONE) 2-0.5 mg Film Daily    cetirizine (ZYRTEC) 10 mg, Daily    cyanocobalamin (VITAMIN B-12) 1,000 mcg, Daily    esomeprazole (NEXIUM) 40 mg, Oral, 2 times daily    ferrous sulfate (FEOSOL) Tab tablet 1 tablet, Daily    fluticasone-umeclidin-vilanter (TRELEGY ELLIPTA) 200-62.5-25 mcg inhaler 1 puff, Inhalation, Daily    folic acid (FOLVITE) 400 mcg, Daily    irbesartan (AVAPRO) 150 mg, Oral, Daily    metoprolol succinate (TOPROL-XL) 50 mg, Oral, Daily    montelukast (SINGULAIR) 10 mg, Nightly    predniSONE (DELTASONE) 20 mg, Oral, 2 times daily    QUEtiapine (SEROQUEL) 50 mg, Nightly    sertraline (ZOLOFT) 100 mg, 2 times daily       Review of patient's allergies indicates:  No Known Allergies     Patient Care Team:  Byron Amador Sr., MD as PCP - General (Family Medicine)  Aayush Al MD (Ophthalmology)  Lennox Cabello MD as Consulting Physician (Gastroenterology)     Subjective:     Review of Systems    12 point review of systems conducted, negative except as stated in the history of present illness. See HPI for details.    Objective:     Visit Vitals  BP (!) 162/82 (BP Location: Right arm, Patient Position: Sitting)   Pulse (!) 46   Resp 20   Ht 5' 3.5" (1.613 m)   Wt 69.9 kg (154 lb 3.2 oz)   BMI 26.89 kg/m²       Physical " Exam  Vitals and nursing note reviewed.   Constitutional:       General: He is not in acute distress.     Appearance: Normal appearance. He is not ill-appearing or diaphoretic.   HENT:      Head: Normocephalic and atraumatic.      Mouth/Throat:      Mouth: Mucous membranes are moist.      Pharynx: Oropharynx is clear.   Eyes:      Extraocular Movements: Extraocular movements intact.      Conjunctiva/sclera: Conjunctivae normal.   Cardiovascular:      Rate and Rhythm: Normal rate and regular rhythm.      Pulses: Normal pulses.      Heart sounds: No murmur heard.  Pulmonary:      Effort: Pulmonary effort is normal. No respiratory distress.      Breath sounds: Wheezing (throughout, bilateral) present. No rhonchi or rales.   Musculoskeletal:      Right lower leg: No edema.      Left lower leg: No edema.   Skin:     General: Skin is warm and dry.   Neurological:      General: No focal deficit present.      Mental Status: He is alert and oriented to person, place, and time. Mental status is at baseline.   Psychiatric:         Mood and Affect: Mood normal.         Labs Reviewed:     Chemistry:  Lab Results   Component Value Date     05/06/2025    K 5.3 (H) 05/06/2025    BUN 18.3 05/06/2025    CREATININE 0.85 05/06/2025    EGFRNORACEVR >60 05/06/2025    CALCIUM 9.0 05/06/2025    ALKPHOS 116 05/06/2025    ALBUMIN 3.8 05/06/2025    BILIDIR 0.2 09/13/2023    IBILI 0.30 09/13/2023    AST 17 05/06/2025    ALT 15 05/06/2025    OULSFNOY75BE 30 05/06/2025    TSH 1.059 05/06/2025    PSA 0.28 11/08/2024        Lab Results   Component Value Date    HGBA1C 4.8 05/06/2025        Hematology:  Lab Results   Component Value Date    WBC 10.09 05/06/2025    HGB 12.8 (L) 05/06/2025    HCT 42.0 05/06/2025     05/06/2025       Lipid Panel:  Lab Results   Component Value Date    CHOL 137 05/06/2025    HDL 40 05/06/2025    LDL 77.00 05/06/2025    TRIG 100 05/06/2025    TOTALCHOLEST 3 05/06/2025        Urine:  No results found for:  ""COLORUA", "APPEARANCEUA", "SGUA", "PHUA", "PROTEINUA", "GLUCOSEUA", "KETONESUA", "BLOODUA", "NITRITESUA", "LEUKOCYTESUR", "RBCUA", "WBCUA", "BACTERIA", "SQEPUA", "HYALINECASTS", "CREATRANDUR", "PROTEINURINE", "UPROTCREA"     Assessment:       ICD-10-CM ICD-9-CM   1. Chronic obstructive pulmonary disease with acute exacerbation  J44.1 491.21   2. Generalized anxiety disorder  F41.1 300.02   3. Chest pain, unspecified type  R07.9 786.50        Plan:     1. Chronic obstructive pulmonary disease with acute exacerbation  Overview:  Continue with Ventolin HFA 90 mcg 1-2 puffs Q4-6 hours PRN SOB, cough, or wheezing + Trelegy Ellipta 200-62.5-25 mcg 1 puff daily.  Avoid/ limit triggers such as pollen ,dust ,mold and animal dander.  Avoid smoke ,strong chemicals and strong cleaning products in addition to strong perfumes and colognes.  Use rescue inhaler or nebulizer when needed for wheezing or shortness of breath.  Report any respiratory tract infections and seek treatment quickly.  If on a steroid maintenance inhaler this should be used every day.  If indicated weight loss is recommended.    Assessment & Plan:  Patient reports Trelegy is expensive, so he is only using about 3 times a week..  We will see if Breztri is any cheaper.  He was provided samples of Breztri today.    Patient has a COPD exacerbation based on history and wheezing on exam.  O2 saturation on room air is 99%.  Patient is breathing comfortably in the room.  We will treat with prednisone 20 mg b.i.d. for 5 days and DuoNebs.  Antibiotics not indicated at this time.    Orders:  -     Ambulatory referral/consult to Cardiology; Future; Expected date: 05/15/2025    2. Generalized anxiety disorder  Overview:  Prescribed Xanax 0.5 mg TID PRN anxiety.  Very anxious  Takes 3 times daily, morning noon and night  This also helps him sleep  History of opioid dependence  Takes buprenorphine-naloxone      Assessment & Plan:  Discussed weaning from Xanax in the future, " but we will leave this to his PCP.  Discussed dangers of taking buprenorphine/naloxone along with Xanax, such as CNS depression and risk of death  Refill Xanax 0.5 mg t.i.d. p.r.n.      Practice deep breathing or abdominal breathing exercises when anxiety occurs.  Exercise daily. Get sunlight daily.  Avoid caffeine, alcohol and stimulants.  Practice positive phrases and repeat throughout the day, along with yoga and relaxation techniques.  Set healthy boundaries, avoid people and conversations that increase stress.  Discussed benefits of medication not becoming noticeable until up to 6 weeks from start date.   Reports any symptoms of suicidal or homicidal ideations immediately, if clinic is closed go to nearest emergency room.    Orders:  -     ALPRAZolam (XANAX) 0.5 MG tablet; Take 1 tablet (0.5 mg total) by mouth 3 (three) times daily as needed for Anxiety.  Dispense: 90 tablet; Refill: 2    3. Chest pain, unspecified type  Assessment & Plan:  Patient has atypical chest pain at rest and exertion.  Patient has mild coronary calcium on recent CT lung cancer screening, and he has a lot of risk factors, including smoking.  We will send to Cardiology for stress testing versus [place].      Other orders  -     budesonide-glycopyr-formoterol 160-9-4.8 mcg/actuation HFAA; Inhale 2 puffs into the lungs once daily.  Dispense: 10.7 g; Refill: 3  -     albuterol-ipratropium (DUO-NEB) 2.5 mg-0.5 mg/3 mL nebulizer solution; Take 3 mLs by nebulization every 6 (six) hours as needed for Wheezing. Rescue  Dispense: 75 mL; Refill: 3  -     predniSONE (DELTASONE) 20 MG tablet; Take 1 tablet (20 mg total) by mouth 2 (two) times daily. for 5 days  Dispense: 10 tablet; Refill: 0         Follow up in about 3 months (around 8/8/2025). In addition to their scheduled follow up, the patient has also been instructed to follow up on as needed basis.     No future appointments.       Natalee Haddad MD

## 2025-05-08 NOTE — ASSESSMENT & PLAN NOTE
Patient reports Trelegy is expensive, so he is only using about 3 times a week..  We will see if Breztri is any cheaper.  He was provided samples of Breztri today.    Patient has a COPD exacerbation based on history and wheezing on exam.  O2 saturation on room air is 99%.  Patient is breathing comfortably in the room.  We will treat with prednisone 20 mg b.i.d. for 5 days and DuoNebs.  Antibiotics not indicated at this time.

## 2025-05-08 NOTE — ASSESSMENT & PLAN NOTE
Patient has atypical chest pain at rest and exertion.  Patient has mild coronary calcium on recent CT lung cancer screening, and he has a lot of risk factors, including smoking.  We will send to Cardiology for stress testing versus [facility].

## 2025-05-09 ENCOUNTER — TELEPHONE (OUTPATIENT)
Dept: FAMILY MEDICINE | Facility: CLINIC | Age: 70
End: 2025-05-09
Payer: MEDICARE

## 2025-05-09 NOTE — TELEPHONE ENCOUNTER
Dr Boo Dempsey called stating that the Breztri does not come in generic and the name brand is not covered by his insurance, But the Trelegy is covered by his insurance.    Please advise.